# Patient Record
Sex: MALE | Race: WHITE | NOT HISPANIC OR LATINO | Employment: FULL TIME | URBAN - METROPOLITAN AREA
[De-identification: names, ages, dates, MRNs, and addresses within clinical notes are randomized per-mention and may not be internally consistent; named-entity substitution may affect disease eponyms.]

---

## 2017-03-19 DIAGNOSIS — I10 ESSENTIAL HYPERTENSION: ICD-10-CM

## 2017-03-20 RX ORDER — LOSARTAN POTASSIUM 50 MG/1
TABLET ORAL
Qty: 90 TABLET | Refills: 0 | Status: SHIPPED | OUTPATIENT
Start: 2017-03-20 | End: 2017-04-13 | Stop reason: SDUPTHER

## 2017-03-22 ENCOUNTER — DOCUMENTATION ONLY (OUTPATIENT)
Dept: FAMILY MEDICINE | Facility: CLINIC | Age: 47
End: 2017-03-22

## 2017-03-22 NOTE — PROGRESS NOTES
Pre-Visit Chart Review  For Appointment Scheduled on 3-    Health Maintenance Due   Topic Date Due    TETANUS VACCINE  08/04/1989    Influenza Vaccine  08/01/2016

## 2017-03-23 ENCOUNTER — OFFICE VISIT (OUTPATIENT)
Dept: FAMILY MEDICINE | Facility: CLINIC | Age: 47
End: 2017-03-23
Payer: COMMERCIAL

## 2017-03-23 VITALS
SYSTOLIC BLOOD PRESSURE: 140 MMHG | BODY MASS INDEX: 40.53 KG/M2 | HEART RATE: 89 BPM | WEIGHT: 252.19 LBS | HEIGHT: 66 IN | TEMPERATURE: 98 F | DIASTOLIC BLOOD PRESSURE: 100 MMHG

## 2017-03-23 DIAGNOSIS — Z00.00 ANNUAL PHYSICAL EXAM: Primary | ICD-10-CM

## 2017-03-23 DIAGNOSIS — Z23 NEED FOR TDAP VACCINATION: ICD-10-CM

## 2017-03-23 DIAGNOSIS — I10 ESSENTIAL HYPERTENSION: ICD-10-CM

## 2017-03-23 DIAGNOSIS — E66.01 MORBID OBESITY DUE TO EXCESS CALORIES: ICD-10-CM

## 2017-03-23 PROCEDURE — 99396 PREV VISIT EST AGE 40-64: CPT | Mod: S$GLB,,, | Performed by: NURSE PRACTITIONER

## 2017-03-23 PROCEDURE — 99999 PR PBB SHADOW E&M-EST. PATIENT-LVL III: CPT | Mod: PBBFAC,,, | Performed by: NURSE PRACTITIONER

## 2017-03-23 PROCEDURE — 3077F SYST BP >= 140 MM HG: CPT | Mod: S$GLB,,, | Performed by: NURSE PRACTITIONER

## 2017-03-23 PROCEDURE — 87591 N.GONORRHOEAE DNA AMP PROB: CPT

## 2017-03-23 PROCEDURE — 3080F DIAST BP >= 90 MM HG: CPT | Mod: S$GLB,,, | Performed by: NURSE PRACTITIONER

## 2017-03-23 NOTE — MR AVS SNAPSHOT
[facility]  [address] [place] [place] [address]  Phone: [phone]  Fax: [phone]                  Shar Hummel   3/23/2017 5:00 PM   Office Visit    Description:  Male : 1970   Provider:  BARRY Delgadillo   Department:  [facility]           Reason for Visit     Annual Exam           Diagnoses this Visit        Comments    Need for Tdap vaccination    -  Primary     Annual physical exam                To Do List           Future Appointments        Provider Department Dept Phone    3/25/2017 9:45 AM SELVIN BLANDON [facility] [phone]    2017 4:20 PM Deena Freeman PA-C [facility] [phone]    7/3/2017 4:00 PM Mahogany Ferreira MD [facility] [phone]      Goals (5 Years of Data)     None      Follow-Up and Disposition     Return in about 2 weeks (around 2017).       These Medications        Disp Refills Start End    diphth,pertus,acell,,tetanus (BOOSTRIX) 2.5-8-5 Lf-mcg-Lf/0.5mL Syrg injection 0.5 mL 0 3/23/2017 3/23/2017    Inject 0.5 mLs into the muscle once. - Intramuscular    Pharmacy: [id number] - [place] - [address] Ph #: [phone]         [facility]     [facility] On Call Nurse Care Line -  Assistance  Registered nurses in the [facility] provide clinical advisement, health education, appointment booking, and other advisory services.  Call for this free service at [phone].             Medications           Message regarding Medications     Verify the changes and/or additions to your medication regime listed below are the same as discussed with your clinician today.  If any of these changes or additions are incorrect, please notify your healthcare provider.        START taking these NEW medications        Refills    diphth,pertus,acell,,tetanus (BOOSTRIX) 2.5-8-5 Lf-mcg-Lf/0.5mL Syrg injection 0    Sig: Inject 0.5 mLs into the muscle once.  "   Class: Normal    Route: Intramuscular      STOP taking these medications     desvenlafaxine succinate (PRISTIQ) 50 MG Tb24 Take 1 tablet (50 mg total) by mouth once daily.           Verify that the below list of medications is an accurate representation of the medications you are currently taking.  If none reported, the list may be blank. If incorrect, please contact your healthcare provider. Carry this list with you in case of emergency.           Current Medications     losartan (COZAAR) 50 MG tablet TAKE 1 TABLET BY MOUTH EVERY DAY    tizanidine (ZANAFLEX) 4 MG tablet Take 1 tablet (4 mg total) by mouth every 6 (six) hours as needed.    diphth,pertus,acell,,tetanus (BOOSTRIX) 2.5-8-5 Lf-mcg-Lf/0.5mL Syrg injection Inject 0.5 mLs into the muscle once.           Clinical Reference Information           Your Vitals Were     BP Pulse Temp Height Weight BMI    143/94 (BP Location: Right arm, Patient Position: Sitting, BP Method: Automatic) 89 97.9 °F (36.6 °C) (Oral) 5' 6" (1.676 m) 114.4 kg (252 lb 3.3 oz) 40.71 kg/m2      Blood Pressure          Most Recent Value    BP  (!)  143/94      Allergies as of 3/23/2017     Bupropion Hcl    Neuromuscular Blockers, Steroidal    Sulfamethoxazole-trimethoprim      Immunizations Administered on Date of Encounter - 3/23/2017     None      Orders Placed During Today's Visit      Normal Orders This Visit    C. trachomatis/N. gonorrhoeae by AMP DNA Urine     Future Labs/Procedures Expected by Expires    Comprehensive metabolic panel  3/23/2017 5/22/2018    HIV-1 and HIV-2 antibodies  3/23/2017 5/22/2018    Lipid panel  3/23/2017 5/22/2018    RPR  3/23/2017 5/22/2018      Instructions      Low-Salt Diet  This diet removes foods that are high in salt. It also limits the amount of salt you use when cooking. It is most often used for people with high blood pressure, edema (fluid retention), and kidney, liver, or heart disease.  Table salt contains the mineral sodium. Your body needs " sodium to work normally. But too much sodium can make your health problems worse. Your healthcare provider is recommending a low-salt (also called low-sodium) diet for you. Your total daily allowance of salt is 1,500 to 2,300 milligrams (mg). It is less than 1 teaspoon of table salt. This means you can have only about 500 to 700 mg of sodium at each meal. People with certain health problems should limit salt intake to the lower end of the recommended range.    When you cook, dont add much salt. If you can cook without using salt, even better. Dont add salt to your food at the table.  When shopping, read food labels. Salt is often called sodium on the label. Choose foods that are salt-free, low salt, or very low salt. Note that foods with reduced salt may not lower your salt intake enough.    Beans, potatoes, and pasta  Ok: Dry beans, split peas, lentils, potatoes, rice, macaroni, pasta, spaghetti without added salt  Avoid: Potato chips, tortilla chips, and similar products  Breads and cereals  Ok: Low-sodium breads, rolls, cereals, and cakes; low-salt crackers, matzo crackers  Avoid: Salted crackers, pretzels, popcorn, Tongan toast, pancakes, muffins  Dairy  Ok: Milk, chocolate milk, hot chocolate mix, low-salt cheeses, and yogurt  Avoid: Processed cheese and cheese spreads; Roquefort, Camembert, and cottage cheese; buttermilk, instant breakfast drink  Desserts  Ok: Ice cream, frozen yogurt, juice bars, gelatin, cookies and pies, sugar, honey, jelly, hard candy  Avoid: Most pies, cakes and cookies prepared or processed with salt; instant pudding  Drinks  Ok: Tea, coffee, fizzy (carbonated) drinks, juices  Avoid: Flavored coffees, electrolyte replacement drinks, sports drinks  Meats  Ok: All fresh meat, fish, poultry, low-salt tuna, eggs, egg substitute  Avoid: Smoked, pickled, brine-cured, or salted meats and fish. This includes sebastian, chipped beef, corned beef, hot dogs, deli meats, ham, kosher meats, salt pork,  sausage, canned tuna, salted codfish, smoked salmon, herring, sardines, or anchovies.  Seasonings and spices  Ok: Most seasonings are okay. Good substitutes for salt include: fresh herb blends, hot sauce, lemon, garlic, william, vinegar, dry mustard, parsley, cilantro, horseradish, tomato paste, regular margarine, mayonnaise, unsalted butter, cream cheese, vegetable oil, cream, low-salt salad dressing and gravy.  Avoid: Regular ketchup, relishes, pickles, soy sauce, teriyaki sauce, Worcestershire sauce, BBQ sauce, tartar sauce, meat tenderizer, chili sauce, regular gravy, regular salad dressing, salted butter  Soups  Ok: Low-salt soups and broths made with allowed foods  Avoid: Bouillon cubes, soups with smoked or salted meats, regular soup and broth  Vegetables  Ok: Most vegetables are okay; also low-salt tomato and vegetable juices  Avoid: Sauerkraut and other brine-soaked vegetables; pickles and other pickled vegetables; tomato juice, olives  Date Last Reviewed: 8/1/2016 © 2000-2016 [employer] [address] [place] [address]. All rights reserved. This information is not intended as a substitute for professional medical care. Always follow your healthcare professional's instructions.             Language Assistance Services     ATTENTION: Language assistance services are available, free of charge. Please call [phone].      ATENCIÓN: Si ednala fidel, tiene a doshi disposición servicios gratuitos de asistencia lingüística. Llame al [phone].     CHÚ Ý: N?u b?n nói Ti?ng Vi?t, có các d?ch v? h? tr? ngôn ng? mi?n phí dành cho b?n. G?i s? [phone].         [facility] complies with applicable Federal civil rights laws and does not discriminate on the basis of race, color, national origin, age, disability, or sex.

## 2017-03-23 NOTE — PROGRESS NOTES
Subjective:       Patient ID: Shar Hummel is a 46 y.o. male.    Chief Complaint: Annual Exam    HPI Comments: Mr. Hummel presents to the clinic today for annual physical exam.  He has a history of hypertension and blood pressure is elevated today.  He thinks he forgot to take his blood pressure medication last night.  He states he had his yearly eye exam and was told he has papilledema.  He was referred to a Neurologist and he had an MRI Brain (Dr. Johns) which showed acoustic neuroma on the right side.  He denies hearing loss.  He does get frequent headaches.  He will be seeing Neurosurgery, Dr. Bass, next week for an initial consult.  He does request STD testing today.  He does not exercise at all.  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  He eats poorly as well and states he knows what he needs to eat but has no motivation to eat healthier.  He does have history of depression and states he has tried many antidepressants which all caused unwanted side effects.  He does not want to try therapy or CBT.  Denies SI/HI.  He declines flu shot and he needs Tdap.    Review of Systems   Constitutional: Negative for activity change and unexpected weight change.   HENT: Negative for hearing loss, rhinorrhea and trouble swallowing.    Eyes: Negative for discharge and visual disturbance.   Respiratory: Negative for chest tightness and wheezing.    Cardiovascular: Negative for chest pain and palpitations.   Gastrointestinal: Negative for blood in stool, constipation, diarrhea and vomiting.   Endocrine: Negative for polydipsia and polyuria.   Genitourinary: Negative for difficulty urinating, hematuria and urgency.   Musculoskeletal: Negative for arthralgias and joint swelling.   Neurological: Positive for headaches. Negative for weakness.   Psychiatric/Behavioral: Positive for dysphoric mood. Negative for confusion, sleep disturbance and suicidal ideas. The patient is not nervous/anxious.         Objective:      Physical Exam   Constitutional: He is oriented to person, place, and time. He appears well-developed and well-nourished. No distress.   HENT:   Head: Normocephalic and atraumatic.   Right Ear: External ear normal.   Left Ear: External ear normal.   Mouth/Throat: Oropharynx is clear and moist. No oropharyngeal exudate.   Eyes: Pupils are equal, round, and reactive to light. Right eye exhibits no discharge. Left eye exhibits no discharge.   Neck: Neck supple. No thyromegaly present.   Cardiovascular: Normal rate and regular rhythm.  Exam reveals no gallop and no friction rub.    No murmur heard.  Pulmonary/Chest: Effort normal and breath sounds normal. No respiratory distress. He has no wheezes. He has no rales.   Abdominal: Soft. He exhibits no distension. There is no tenderness.   Lymphadenopathy:     He has no cervical adenopathy.   Neurological: He is alert and oriented to person, place, and time. Coordination normal.   Skin: Skin is warm and dry.   Psychiatric: He has a normal mood and affect. His behavior is normal. Thought content normal.   Vitals reviewed.          Current Outpatient Prescriptions:     losartan (COZAAR) 50 MG tablet, TAKE 1 TABLET BY MOUTH EVERY DAY, Disp: 90 tablet, Rfl: 0    tizanidine (ZANAFLEX) 4 MG tablet, Take 1 tablet (4 mg total) by mouth every 6 (six) hours as needed., Disp: 90 tablet, Rfl: 1    diphth,pertus,acell,,tetanus (BOOSTRIX) 2.5-8-5 Lf-mcg-Lf/0.5mL Syrg injection, Inject 0.5 mLs into the muscle once., Disp: 0.5 mL, Rfl: 0  Assessment:       1. Annual physical exam    2. Need for Tdap vaccination    3. Essential hypertension    4. Morbid obesity due to excess calories        Plan:     Annual physical exam  Fasting labs.  -     Comprehensive metabolic panel; Future; Expected date: 3/23/17  -     Lipid panel; Future; Expected date: 3/23/17  -     C. trachomatis/N. gonorrhoeae by AMP DNA Urine  -     HIV-1 and HIV-2 antibodies; Future; Expected date:  3/23/17  -     RPR; Future; Expected date: 3/23/17    Need for Tdap vaccination  -     diphth,pertus,acell,,tetanus (BOOSTRIX) 2.5-8-5 Lf-mcg-Lf/0.5mL Syrg injection; Inject 0.5 mLs into the muscle once.  Dispense: 0.5 mL; Refill: 0    Essential hypertension  Uncontrolled, forgot to take meds before appointment.  2 week f/u nurse visit for BP check.  Bring BP log.    Morbid obesity due to excess calories  Discussed health dangers of obesity including higher risk for diabetes, heart disease, cancer.  Encouraged patient to eat protein in the morning. Limit portion sizes.  Routine exercise.   Comprehensive Weight Loss program handout given.  He will call if he decides to do this.   Discussed benefits of a diet diary.    Patient readiness: nonacceptance and barriers:readiness and occupational issues    During the course of the visit the patient was educated and counseled about the following:     Hypertension:   Medication: no change.  Dietary sodium restriction.  Regular aerobic exercise.  Check blood pressures daily and record.  Follow up: 2 weeks and as needed.  Obesity:   General weight loss/lifestyle modification strategies discussed (elicit support from others; identify saboteurs; non-food rewards, etc).  Informal exercise measures discussed, e.g. taking stairs instead of elevator.  Regular aerobic exercise program discussed.    Goals: Hypertension: Reduce Blood Pressure and Obesity: Reduce calorie intake and BMI    Did patient meet goals/outcomes: No    The following self management tools provided: blood pressure log    Patient Instructions (the written plan) was given to the patient/family.     Time spent with patient: 30 minutes

## 2017-03-23 NOTE — PATIENT INSTRUCTIONS

## 2017-03-24 ENCOUNTER — PATIENT MESSAGE (OUTPATIENT)
Dept: FAMILY MEDICINE | Facility: CLINIC | Age: 47
End: 2017-03-24

## 2017-03-24 ENCOUNTER — DOCUMENTATION ONLY (OUTPATIENT)
Dept: FAMILY MEDICINE | Facility: CLINIC | Age: 47
End: 2017-03-24

## 2017-03-24 NOTE — PROGRESS NOTES
Pre-Visit Chart Review  For Appointment Scheduled on 4/7/17    Health Maintenance Due   Topic Date Due    TETANUS VACCINE  08/04/1989    Influenza Vaccine  08/01/2016

## 2017-03-25 ENCOUNTER — LAB VISIT (OUTPATIENT)
Dept: LAB | Facility: HOSPITAL | Age: 47
End: 2017-03-25
Attending: FAMILY MEDICINE
Payer: COMMERCIAL

## 2017-03-25 DIAGNOSIS — Z00.00 ANNUAL PHYSICAL EXAM: ICD-10-CM

## 2017-03-25 LAB
ALBUMIN SERPL BCP-MCNC: 3.9 G/DL
ALP SERPL-CCNC: 57 U/L
ALT SERPL W/O P-5'-P-CCNC: 19 U/L
ANION GAP SERPL CALC-SCNC: 9 MMOL/L
AST SERPL-CCNC: 18 U/L
BILIRUB SERPL-MCNC: 1 MG/DL
BUN SERPL-MCNC: 14 MG/DL
CALCIUM SERPL-MCNC: 9.2 MG/DL
CHLORIDE SERPL-SCNC: 106 MMOL/L
CHOLEST/HDLC SERPL: 5 {RATIO}
CO2 SERPL-SCNC: 26 MMOL/L
CREAT SERPL-MCNC: 1 MG/DL
EST. GFR  (AFRICAN AMERICAN): >60 ML/MIN/1.73 M^2
EST. GFR  (NON AFRICAN AMERICAN): >60 ML/MIN/1.73 M^2
GLUCOSE SERPL-MCNC: 91 MG/DL
HDL/CHOLESTEROL RATIO: 20 %
HDLC SERPL-MCNC: 140 MG/DL
HDLC SERPL-MCNC: 28 MG/DL
LDLC SERPL CALC-MCNC: 91.2 MG/DL
NONHDLC SERPL-MCNC: 112 MG/DL
POTASSIUM SERPL-SCNC: 4.2 MMOL/L
PROT SERPL-MCNC: 7.2 G/DL
SODIUM SERPL-SCNC: 141 MMOL/L
TRIGL SERPL-MCNC: 104 MG/DL

## 2017-03-25 PROCEDURE — 80053 COMPREHEN METABOLIC PANEL: CPT

## 2017-03-25 PROCEDURE — 36415 COLL VENOUS BLD VENIPUNCTURE: CPT | Mod: PO

## 2017-03-25 PROCEDURE — 86592 SYPHILIS TEST NON-TREP QUAL: CPT

## 2017-03-25 PROCEDURE — 80061 LIPID PANEL: CPT

## 2017-03-25 PROCEDURE — 86703 HIV-1/HIV-2 1 RESULT ANTBDY: CPT

## 2017-03-27 LAB
HIV 1+2 AB+HIV1 P24 AG SERPL QL IA: NEGATIVE
RPR SER QL: NORMAL

## 2017-03-28 LAB
C TRACH DNA SPEC QL NAA+PROBE: NOT DETECTED
N GONORRHOEA DNA SPEC QL NAA+PROBE: NOT DETECTED

## 2017-04-13 DIAGNOSIS — I10 ESSENTIAL HYPERTENSION: ICD-10-CM

## 2017-04-13 RX ORDER — LOSARTAN POTASSIUM 50 MG/1
50 TABLET ORAL DAILY
Qty: 90 TABLET | Refills: 0 | Status: SHIPPED | OUTPATIENT
Start: 2017-04-13 | End: 2017-07-17 | Stop reason: SDUPTHER

## 2017-05-03 ENCOUNTER — DOCUMENTATION ONLY (OUTPATIENT)
Dept: FAMILY MEDICINE | Facility: CLINIC | Age: 47
End: 2017-05-03

## 2017-05-03 ENCOUNTER — TELEPHONE (OUTPATIENT)
Dept: FAMILY MEDICINE | Facility: CLINIC | Age: 47
End: 2017-05-03

## 2017-05-03 NOTE — TELEPHONE ENCOUNTER
Left message to inquire for information on what kind of surgery patient is having that he needs appointment check up tomorrow, 5.4.17 with BARRY Perez

## 2017-05-03 NOTE — PROGRESS NOTES
Pre-Visit Chart Review  For Appointment Scheduled on 5/4/17    Health Maintenance Due   Topic Date Due    TETANUS VACCINE  08/04/1989

## 2017-05-03 NOTE — TELEPHONE ENCOUNTER
Spoke with patient who stated he needed clearance for a form of brain surgery, I stated BARRY Perez could not do this type of appointment, and I would speak with his listed PCP and nurse about this issue. I mentioned the time frame problem of May 16 surgery and we will be working on this.

## 2017-05-04 ENCOUNTER — TELEPHONE (OUTPATIENT)
Dept: FAMILY MEDICINE | Facility: CLINIC | Age: 47
End: 2017-05-04

## 2017-05-04 NOTE — TELEPHONE ENCOUNTER
----- Message from Ginger Ellis sent at 5/4/2017  2:06 PM CDT -----  Patient is returning nurses call contact patient at [phone].      Thank you

## 2017-05-09 ENCOUNTER — DOCUMENTATION ONLY (OUTPATIENT)
Dept: FAMILY MEDICINE | Facility: CLINIC | Age: 47
End: 2017-05-09

## 2017-05-09 NOTE — PROGRESS NOTES
Pre-Visit Chart Review  For Appointment Scheduled on 5/10/17.    Health Maintenance Due   Topic Date Due    TETANUS VACCINE  08/04/1989

## 2017-05-10 ENCOUNTER — PATIENT MESSAGE (OUTPATIENT)
Dept: ADMINISTRATIVE | Facility: OTHER | Age: 47
End: 2017-05-10

## 2017-05-10 ENCOUNTER — LAB VISIT (OUTPATIENT)
Dept: LAB | Facility: HOSPITAL | Age: 47
End: 2017-05-10
Attending: FAMILY MEDICINE
Payer: COMMERCIAL

## 2017-05-10 ENCOUNTER — OFFICE VISIT (OUTPATIENT)
Dept: FAMILY MEDICINE | Facility: CLINIC | Age: 47
End: 2017-05-10
Payer: COMMERCIAL

## 2017-05-10 ENCOUNTER — HOSPITAL ENCOUNTER (OUTPATIENT)
Dept: RADIOLOGY | Facility: CLINIC | Age: 47
Discharge: HOME | End: 2017-05-10
Attending: FAMILY MEDICINE
Payer: COMMERCIAL

## 2017-05-10 VITALS
TEMPERATURE: 98 F | WEIGHT: 254.19 LBS | HEART RATE: 89 BPM | BODY MASS INDEX: 40.85 KG/M2 | HEIGHT: 66 IN | SYSTOLIC BLOOD PRESSURE: 120 MMHG | DIASTOLIC BLOOD PRESSURE: 80 MMHG

## 2017-05-10 DIAGNOSIS — Z01.818 PRE-OP EVALUATION: ICD-10-CM

## 2017-05-10 DIAGNOSIS — E66.01 MORBID OBESITY DUE TO EXCESS CALORIES: ICD-10-CM

## 2017-05-10 DIAGNOSIS — Z01.818 PRE-OP EVALUATION: Primary | ICD-10-CM

## 2017-05-10 DIAGNOSIS — I10 ESSENTIAL HYPERTENSION: ICD-10-CM

## 2017-05-10 LAB
BASOPHILS # BLD AUTO: 0.04 K/UL
BASOPHILS NFR BLD: 0.4 %
DIFFERENTIAL METHOD: NORMAL
EOSINOPHIL # BLD AUTO: 0.2 K/UL
EOSINOPHIL NFR BLD: 2.4 %
ERYTHROCYTE [DISTWIDTH] IN BLOOD BY AUTOMATED COUNT: 13.9 %
HCT VFR BLD AUTO: 43.5 %
HGB BLD-MCNC: 14.2 G/DL
LYMPHOCYTES # BLD AUTO: 2.5 K/UL
LYMPHOCYTES NFR BLD: 25.3 %
MCH RBC QN AUTO: 28.8 PG
MCHC RBC AUTO-ENTMCNC: 32.6 %
MCV RBC AUTO: 88 FL
MONOCYTES # BLD AUTO: 0.7 K/UL
MONOCYTES NFR BLD: 7.6 %
NEUTROPHILS # BLD AUTO: 6.2 K/UL
NEUTROPHILS NFR BLD: 64.2 %
PLATELET # BLD AUTO: 225 K/UL
PMV BLD AUTO: 9.4 FL
RBC # BLD AUTO: 4.93 M/UL
WBC # BLD AUTO: 9.71 K/UL

## 2017-05-10 PROCEDURE — 3074F SYST BP LT 130 MM HG: CPT | Mod: S$GLB,,, | Performed by: FAMILY MEDICINE

## 2017-05-10 PROCEDURE — 1160F RVW MEDS BY RX/DR IN RCRD: CPT | Mod: S$GLB,,, | Performed by: FAMILY MEDICINE

## 2017-05-10 PROCEDURE — 99999 PR PBB SHADOW E&M-EST. PATIENT-LVL III: CPT | Mod: PBBFAC,,, | Performed by: FAMILY MEDICINE

## 2017-05-10 PROCEDURE — 99214 OFFICE O/P EST MOD 30 MIN: CPT | Mod: S$GLB,,, | Performed by: FAMILY MEDICINE

## 2017-05-10 PROCEDURE — 71020 XR CHEST PA AND LATERAL: CPT | Mod: 26,,, | Performed by: RADIOLOGY

## 2017-05-10 PROCEDURE — 71020 XR CHEST PA AND LATERAL: CPT | Mod: TC,PO

## 2017-05-10 PROCEDURE — 3079F DIAST BP 80-89 MM HG: CPT | Mod: S$GLB,,, | Performed by: FAMILY MEDICINE

## 2017-05-10 PROCEDURE — 93005 ELECTROCARDIOGRAM TRACING: CPT | Mod: S$GLB,,, | Performed by: FAMILY MEDICINE

## 2017-05-10 PROCEDURE — 93010 ELECTROCARDIOGRAM REPORT: CPT | Mod: S$GLB,,, | Performed by: INTERNAL MEDICINE

## 2017-05-10 PROCEDURE — 36415 COLL VENOUS BLD VENIPUNCTURE: CPT | Mod: PO

## 2017-05-10 PROCEDURE — 85025 COMPLETE CBC W/AUTO DIFF WBC: CPT

## 2017-05-10 RX ORDER — NAPROXEN AND ESOMEPRAZOLE MAGNESIUM 500; 20 MG/1; MG/1
1 TABLET, DELAYED RELEASE ORAL 2 TIMES DAILY
COMMUNITY
Start: 2017-04-07 | End: 2018-10-17

## 2017-05-10 NOTE — PATIENT INSTRUCTIONS
Weight Management: Getting Started  Healthy bodies come in all shapes and sizes. Not all bodies are made to be thin. For some people, a healthy weight is higher than the average weight listed on weight charts. Your healthcare provider can help you decide on a healthy weight for you.    Reasons to lose weight  Losing weight can help with some health problems, such as high blood pressure, heart disease, diabetes, sleep apnea, and arthritis. You may also feel more energy.  Set your long-term goal  Your goal doesn't even have to be a specific weight. You may decide on a fitness goal (such as being able to walk 10 miles a week), or a health goal (such as lowering your blood pressure). Choose a goal that is measurable and reasonable, so you know when you've reached it. A goal of reaching a BMI of less than 25 is not always reasonable (or possible).   Make an action plan  Habits dont change overnight. Setting your goals too high can leave you feeling discouraged if you cant reach them. Be realistic. Choose one or two small changes you can make now. Set an action plan for how you are going to make these changes. When you can stick to this plan, keep making a few more small changes. Taking small steps will help you stay on the path to success.  Track your progress  Write down your goals. Then, keep a daily record of your progress. Write down what you eat and how active you are. This record lets you look back on how much youve done. It may also help when youre feeling frustrated. Reward yourself for success. Even if you dont reach every goal, give yourself credit for what you do get done.  Get support  Encouragement from others can help make losing weight easier. Ask your family members and friends for support. They may even want to join you. Also look to your healthcare provider, registered dietitian, and  for help. Your local hospital can give you more information about nutrition, exercise, and  weight loss.  Date Last Reviewed: 1/31/2016 © 2000-2016 [employer] [address] [place] [address], [place] [address]. All rights reserved. This information is not intended as a substitute for professional medical care. Always follow your healthcare professional's instructions.        Walking for Fitness  Fitness walking has something for everyone, even people who are already fit. Walking is one of the safest ways to condition your body aerobically. It can boost energy, help you lose weight, and reduce stress.    Physical benefits  · Walking strengthens your heart and lungs, and tones your muscles.  · When walking, your feet land with less impact than in other sports. This reduces chances of muscle, bone, and joint injury.  · Regular walking improves your cholesterol levels and lowers your risk of heart disease. And it helps you control your blood sugar if you have diabetes.  · Walking is a weight-bearing activity, which helps maintain bone density. This can help prevent osteoporosis.  Personal rewards  · Taking walks can help you relax and manage stress. And fitness walking may make you feel better about yourself.  · Walking can help you sleep better at night and make you less likely to be depressed.  · Regular walking may help maintain your memory as you get older.  · Walking is a great way to spend extra time with friends and family members. Be sure to invite your dog along!  Q&A about fitness walking  Q: Will walking keep me fit?  A: Yes. Regular walking at the right pace gives you all the benefits of other aerobic activities, such as jogging and swimming.  Q: Will walking help me lose weight and keep it off?  A: Yes. Per mile, walking can burn as many calories as jogging. Your health care provider can help work walking into your weight-loss plan.  Q: Is walking safe for my health?  A: Yes. Walking is safe if you have high blood pressure, diabetes, heart disease, or other conditions. Talk to your health  care provider before you start.  Date Last Reviewed: 5/9/2015  © 6123-2161 [employer]. [address] [place] [address], [place] [address]. All rights reserved. This information is not intended as a substitute for professional medical care. Always follow your healthcare professional's instructions.

## 2017-05-10 NOTE — MR AVS SNAPSHOT
[facility]  [address]  [place] [address]  Phone: [phone]  Fax: [phone]                  Shar Hummel   5/10/2017 2:20 PM   Office Visit    Description:  Male : 1970   Provider:  Joshua Hebert MD   Department:  [place] - Family Medicine           Reason for Visit     Pre-op Exam           Diagnoses this Visit        Comments    Pre-op evaluation    -  Primary            To Do List           Future Appointments        Provider Department Dept Phone    2017 8:20 AM Joshua Hebert MD [facility] [phone]      Goals (5 Years of Data)     None      [facility]     [facility] -  Assistance  Unless otherwise directed by your provider, please contact [facility], our nurse care line that is available for  assistance.     Registered nurses in the [facility] provide: appointment scheduling, clinical advisement, health education, and other advisory services.  Call: [phone] (toll free)               Medications           Message regarding Medications     Verify the changes and/or additions to your medication regime listed below are the same as discussed with your clinician today.  If any of these changes or additions are incorrect, please notify your healthcare provider.             Verify that the below list of medications is an accurate representation of the medications you are currently taking.  If none reported, the list may be blank. If incorrect, please contact your healthcare provider. Carry this list with you in case of emergency.           Current Medications     losartan (COZAAR) 50 MG tablet Take 1 tablet (50 mg total) by mouth once daily.    tizanidine (ZANAFLEX) 4 MG tablet Take 1 tablet (4 mg total) by mouth every 6 (six) hours as needed.    VIMOVO 500-20 mg TbID Take 1 tablet by mouth 2 (two) times daily.            Clinical Reference Information           Your Vitals Were     BP Pulse  "Temp Height Weight BMI    120/80 89 98.4 °F (36.9 °C) 5' 6" (1.676 m) 115.3 kg (254 lb 3.1 oz) 41.03 kg/m2      Blood Pressure          Most Recent Value    BP  120/80      Allergies as of 5/10/2017     Bupropion Hcl    Neuromuscular Blockers, Steroidal    Sulfa (Sulfonamide Antibiotics)    Sulfamethoxazole-trimethoprim    Gabapentin      Immunizations Administered on Date of Encounter - 5/10/2017     None      Orders Placed During Today's Visit      Normal Orders This Visit    Assign HDMP Onboarding Questionnaire Series     EKG 12-lead     Hypertension Digital Medicine (HDMP)  Enrollment Order     Future Labs/Procedures Expected by Expires    CBC auto differential  5/10/2017 7/9/2018    X-Ray Chest PA And Lateral  5/10/2017 5/10/2018      Instructions      Weight Management: Getting Started  Healthy bodies come in all shapes and sizes. Not all bodies are made to be thin. For some people, a healthy weight is higher than the average weight listed on weight charts. Your healthcare provider can help you decide on a healthy weight for you.    Reasons to lose weight  Losing weight can help with some health problems, such as high blood pressure, heart disease, diabetes, sleep apnea, and arthritis. You may also feel more energy.  Set your long-term goal  Your goal doesn't even have to be a specific weight. You may decide on a fitness goal (such as being able to walk 10 miles a week), or a health goal (such as lowering your blood pressure). Choose a goal that is measurable and reasonable, so you know when you've reached it. A goal of reaching a BMI of less than 25 is not always reasonable (or possible).   Make an action plan  Habits dont change overnight. Setting your goals too high can leave you feeling discouraged if you cant reach them. Be realistic. Choose one or two small changes you can make now. Set an action plan for how you are going to make these changes. When you can stick to this plan, keep making a few more " small changes. Taking small steps will help you stay on the path to success.  Track your progress  Write down your goals. Then, keep a daily record of your progress. Write down what you eat and how active you are. This record lets you look back on how much youve done. It may also help when youre feeling frustrated. Reward yourself for success. Even if you dont reach every goal, give yourself credit for what you do get done.  Get support  Encouragement from others can help make losing weight easier. Ask your family members and friends for support. They may even want to join you. Also look to your healthcare provider, registered dietitian, and  for help. Your local hospital can give you more information about nutrition, exercise, and weight loss.  Date Last Reviewed: 1/31/2016 © 2000-2016 [employer]. [address] [place] [address]. All rights reserved. This information is not intended as a substitute for professional medical care. Always follow your healthcare professional's instructions.        Walking for Fitness  Fitness walking has something for everyone, even people who are already fit. Walking is one of the safest ways to condition your body aerobically. It can boost energy, help you lose weight, and reduce stress.    Physical benefits  · Walking strengthens your heart and lungs, and tones your muscles.  · When walking, your feet land with less impact than in other sports. This reduces chances of muscle, bone, and joint injury.  · Regular walking improves your cholesterol levels and lowers your risk of heart disease. And it helps you control your blood sugar if you have diabetes.  · Walking is a weight-bearing activity, which helps maintain bone density. This can help prevent osteoporosis.  Personal rewards  · Taking walks can help you relax and manage stress. And fitness walking may make you feel better about yourself.  · Walking can help you sleep better at  night and make you less likely to be depressed.  · Regular walking may help maintain your memory as you get older.  · Walking is a great way to spend extra time with friends and family members. Be sure to invite your dog along!  Q&A about fitness walking  Q: Will walking keep me fit?  A: Yes. Regular walking at the right pace gives you all the benefits of other aerobic activities, such as jogging and swimming.  Q: Will walking help me lose weight and keep it off?  A: Yes. Per mile, walking can burn as many calories as jogging. Your health care provider can help work walking into your weight-loss plan.  Q: Is walking safe for my health?  A: Yes. Walking is safe if you have high blood pressure, diabetes, heart disease, or other conditions. Talk to your health care provider before you start.  Date Last Reviewed: 5/9/2015  © 3535-9624 [employer]. [address] [place] [address]. All rights reserved. This information is not intended as a substitute for professional medical care. Always follow your healthcare professional's instructions.             Hypertension Digital Medicine Program Information              As discussed, you could benefit from enrolling in the Hypertension Digital Medicine Program. The goal of the program is to help you effectively manage your high blood pressure through an appropriate balance of medication and lifestyle changes, all from the comfort of your own home. Effectively managed blood pressure reduces your risk of having a heart attack or a stroke in the future, so you can enjoy a long and healthy life. We want to make blood pressure control your goal.        What is the Hypertension Digital Medicine Program?  Finding the right balance in managing high blood pressure is different for every person, and we will tailor our treatment approach based on your individual needs using the most current evidence-based guidelines.  As a participant, you will be able to send home  "blood pressure readings, on your schedule, directly into your medical record at [facility]. I, along with a team of pharmacists, will monitor this data, help you adjust your medication(s) and/or make lifestyle recommendations to better manage your hypertension.       What are the requirements of the program?   Participating in the program is as easy as 1 - 2 - 3.   1. Smartphone - You must have your own smartphone to participate (either an iPhone or an Android phone such as [employer], [employer], [employer], [employer], [employer], or [employer]).    2. [facility] account - [facility] offers a great way to connect through the online patient portal, [facility], which is free and provides you access to your [facility] medical record.  3. Digital blood pressure cuff - Using this blood pressure cuff that hooks up to your smartphone, you will be able to send in your home blood pressure readings to the Hypertension Digital Medicine Team. Cuffs are available for purchase at the [facility] locations and financial assistance plans are available           What can I do to get started?   Complete the Hypertension Digital Medicine Patient Consent questionnaire already available in your [facility] account. To access and complete this questionnaire, either  - Use the [facility] website on a computer and select My Medical Record, then Questionnaires.  - Use the Fl[employer] uyen on your smartphone and select "Questionnaires".    Once you have given consent, additional onboarding questionnaires will be assigned to you to complete prior to starting the program. You must return to the "Questionnaires" page of your [facility] account to start the additional questionnaires.     How do I purchase a digital blood pressure cuff and obtain a discount?  [facility] has negotiated a discounted price from industry leading healthcare technology companies. While supplies last patients using an Apple iPhone can purchase an [employer] Ease Blood Pressure cuff for $31.99 (originally " $39.99) and Android users can purchase the My [facility]ist Blood Pressure Monitor for $79.99 (originally $99.99).  Financial assistance plans are also available for increased discounting. Purchase locations will be sent once all onboarding questionnaires have been completed (see above).    If you have any questions regarding this program or would like more information, please visit our Hypertension Digital Medicine website ([email or web address]) or call Digital Medicine Patient Support at [phone].          Language Assistance Services     ATTENTION: Language assistance services are available, free of charge. Please call [phone].      ATENCIÓN: Si habla español, tiene a doshi disposición servicios gratuitos de asistencia lingüística. Llame al [phone].     SAMSON Ý: N?u b?n nói Ti?ng Vi?t, có các d?ch v? h? tr? ngôn ng? mi?n phí dành cho b?n. G?i s? [phone].         [facility] complies with applicable Federal civil rights laws and does not discriminate on the basis of race, color, national origin, age, disability, or sex.

## 2017-05-11 ENCOUNTER — PATIENT MESSAGE (OUTPATIENT)
Dept: FAMILY MEDICINE | Facility: CLINIC | Age: 47
End: 2017-05-11

## 2017-05-11 NOTE — PROGRESS NOTES
"[facility]  Progress Note    Subjective:       Patient ID: Shar Hummel is a 46 y.o. male.    Chief Complaint: Pre-op Exam    HPI46 y.o.male with current medical history of hypertension and obesity is here today for pre-op clearance.  Patient is scheduled to have acoustic neuroma removed from right ear by ENT.  All patient's medical conditions are under good control.  No further complaints at today's visit.  Review of Systems   Constitutional: Negative for activity change and unexpected weight change.   HENT: Negative for hearing loss, rhinorrhea and trouble swallowing.    Eyes: Negative for discharge and visual disturbance.   Respiratory: Negative for chest tightness and wheezing.    Cardiovascular: Negative for chest pain and palpitations.   Gastrointestinal: Negative for blood in stool, constipation, diarrhea and vomiting.   Endocrine: Negative for polydipsia and polyuria.   Genitourinary: Negative for difficulty urinating, hematuria and urgency.   Musculoskeletal: Negative for arthralgias and joint swelling.   Neurological: Negative for weakness and headaches.   Psychiatric/Behavioral: Negative for confusion and dysphoric mood.       Objective:      Vitals:    05/10/17 1417 05/10/17 1440   BP: 134/89 120/80   BP Location: Right arm    Patient Position: Sitting    BP Method: Automatic    Pulse: 89    Temp: 98.6 °F (37 °C) 98.4 °F (36.9 °C)   TempSrc: Oral Comment: manual BP per MD   Weight: 115.3 kg (254 lb 3.1 oz)    Height: 5' 6" (1.676 m)      Body mass index is 41.03 kg/(m^2).  Physical Exam   Constitutional: He is oriented to person, place, and time. He appears well-developed and well-nourished.   HENT:   Head: Normocephalic and atraumatic.   Eyes: Conjunctivae and EOM are normal. Pupils are equal, round, and reactive to light.   Neck: Normal range of motion. Neck supple. No JVD present.   Cardiovascular: Normal rate, regular rhythm, normal heart sounds and intact distal pulses.  Exam reveals no " gallop and no friction rub.    No murmur heard.  Pulmonary/Chest: Effort normal and breath sounds normal. No respiratory distress. He has no wheezes.   Abdominal: Soft. Bowel sounds are normal. There is no tenderness.   Musculoskeletal: Normal range of motion.   Neurological: He is alert and oriented to person, place, and time. No cranial nerve deficit.   Skin: Skin is warm and dry.   Psychiatric: He has a normal mood and affect. His behavior is normal. Judgment and thought content normal.   Nursing note and vitals reviewed.      Assessment:       1. Pre-op evaluation    2. Morbid obesity due to excess calories    3. Essential hypertension        Plan:       Pre-op evaluation  -     CBC auto differential; Future; Expected date: 5/10/17  -     EKG 12-lead  -     X-Ray Chest PA And Lateral; Future; Expected date: 5/10/17    Morbid obesity due to excess calories        - Patient educated on diet and exercise     Essential hypertension        - Well controlled continue current medications  -     Hypertension Digital Medicine (HDMP)  Enrollment Order  -     Assign HDMP Onboarding Questionnaire Series    Patient readiness: acceptance and barriers:none    During the course of the visit the patient was educated and counseled about the following:     Hypertension:   Dietary sodium restriction.  Regular aerobic exercise.  Check blood pressures daily and record.  Obesity:   General weight loss/lifestyle modification strategies discussed (elicit support from others; identify saboteurs; non-food rewards, etc).  Informal exercise measures discussed, e.g. taking stairs instead of elevator.  Regular aerobic exercise program discussed.    Goals: Hypertension: Reduce Blood Pressure and Obesity: Reduce calorie intake and BMI    Did patient meet goals/outcomes: Yes    The following self management tools provided: blood pressure log  excercise log    Patient Instructions (the written plan) was given to the patient/family.     Time spent  with patient: 30 minutes      Return if symptoms worsen or fail to improve.  Joshua Hebert MD  [facility]  5/11/2017 8:32 AM

## 2017-05-19 ENCOUNTER — PATIENT MESSAGE (OUTPATIENT)
Dept: FAMILY MEDICINE | Facility: CLINIC | Age: 47
End: 2017-05-19

## 2017-05-29 ENCOUNTER — DOCUMENTATION ONLY (OUTPATIENT)
Dept: FAMILY MEDICINE | Facility: CLINIC | Age: 47
End: 2017-05-29

## 2017-05-29 NOTE — PROGRESS NOTES
Pre-Visit Chart Review  For Appointment Scheduled on (05/30/17)    Health Maintenance Due   Topic Date Due    TETANUS VACCINE  08/04/1988

## 2017-05-30 ENCOUNTER — HOSPITAL ENCOUNTER (OUTPATIENT)
Dept: RADIOLOGY | Facility: CLINIC | Age: 47
Discharge: HOME | End: 2017-05-30
Attending: FAMILY MEDICINE
Payer: COMMERCIAL

## 2017-05-30 ENCOUNTER — OFFICE VISIT (OUTPATIENT)
Dept: FAMILY MEDICINE | Facility: CLINIC | Age: 47
End: 2017-05-30
Payer: COMMERCIAL

## 2017-05-30 VITALS
HEIGHT: 66 IN | BODY MASS INDEX: 41.06 KG/M2 | SYSTOLIC BLOOD PRESSURE: 131 MMHG | DIASTOLIC BLOOD PRESSURE: 89 MMHG | HEART RATE: 103 BPM | WEIGHT: 255.5 LBS

## 2017-05-30 DIAGNOSIS — Z01.818 PREOP EXAMINATION: ICD-10-CM

## 2017-05-30 DIAGNOSIS — Z01.818 PREOP EXAMINATION: Primary | ICD-10-CM

## 2017-05-30 PROCEDURE — 71020 XR CHEST PA AND LATERAL: CPT | Mod: TC,PO

## 2017-05-30 PROCEDURE — 93010 ELECTROCARDIOGRAM REPORT: CPT | Mod: S$GLB,,, | Performed by: INTERNAL MEDICINE

## 2017-05-30 PROCEDURE — 71020 XR CHEST PA AND LATERAL: CPT | Mod: 26,,, | Performed by: RADIOLOGY

## 2017-05-30 PROCEDURE — 99214 OFFICE O/P EST MOD 30 MIN: CPT | Mod: S$GLB,,, | Performed by: FAMILY MEDICINE

## 2017-05-30 PROCEDURE — 99999 PR PBB SHADOW E&M-EST. PATIENT-LVL III: CPT | Mod: PBBFAC,,, | Performed by: FAMILY MEDICINE

## 2017-05-30 PROCEDURE — 93005 ELECTROCARDIOGRAM TRACING: CPT | Mod: S$GLB,,, | Performed by: FAMILY MEDICINE

## 2017-05-30 NOTE — PROGRESS NOTES
Subjective:     Pt presents to the office today for a preoperative consultation at the request of surgeon Dr Santiago who plans on performing Right retrosigmoid acoustic neuroma removal on June 20.   This consultation is requested for the specific conditions prompting preoperative evaluation (i.e. because of potential affect on operative risk): None.   Planned anesthesia: none.   The patient has the following known anesthesia issues: None.   Patients bleeding risk: no recent abnormal bleeding.   Patient does not have objections to receiving blood products if needed.    The following portions of the patient's history were reviewed and updated as appropriate: allergies, current medications, past family history, past medical history, past social history, past surgical history and problem list.    Review of Systems  Review of Systems   Constitutional: Negative for chills and fever.   HENT: Negative for sore throat.    Eyes: Negative for visual disturbance.   Respiratory: Negative for cough and shortness of breath.    Cardiovascular: Negative for chest pain and leg swelling.   Gastrointestinal: Negative for abdominal pain, blood in stool, constipation, diarrhea and vomiting.   Genitourinary: Negative for difficulty urinating, dysuria and hematuria.   Musculoskeletal: Negative for arthralgias.   Neurological: Negative for dizziness and weakness.      Objective:     Physical Exam   Constitutional: Patient appears well-developed and well-nourished. No distress.   HENT:   Head: Normocephalic and atraumatic.   Mouth/Throat: Oropharynx is clear and moist. No oropharyngeal exudate.   Eyes: EOM are normal. Pupils are equal, round, and reactive to light.   Neck: Normal range of motion. Neck supple. No thyromegaly present.   Cardiovascular: Normal rate, regular rhythm, normal heart sounds and intact distal pulses.    Pulmonary/Chest: Effort normal and breath sounds normal. No respiratory distress. She has no wheezes.   Abdominal:  Soft. Bowel sounds are normal. She exhibits no distension and no mass. There is no tenderness.   Musculoskeletal: She exhibits no edema.   Lymphadenopathy:     She has no cervical adenopathy.   Neurological: She is alert.   Skin: Skin is warm. No rash noted. No erythema.   Psychiatric: She has a normal mood and affect. Her behavior is normal.   Vitals reviewed.      Predictors of intubation difficulty:   Morbid obesity? yes - BMI 41.24   Anatomically abnormal facies? no   Prominent incisors? no   Receding mandible? no   Short, thick neck? yes    Neck range of motion: normal   Mallampati score: III (soft and hard palate and base of uvula visible)    Cardiographics  ECG: Unremarkable    Imaging  Chest x-ray: normal     Lab Review   Reviewed.     Assessment:       Patient with planned surgery as above.    Known risk factors for perioperative complications: None    Difficulty with intubation is anticipated.     Plan:     Revised Cardiac Risk Index for Pre-Operative Risk    [email or web address]    1. Preoperative workup as follows none.  2. Change in medication regimen before surgery: none, continue medication regimen including morning of surgery, with sip of water.  3. Prophylaxis for cardiac events with perioperative beta-blockers: should be considered, specific regimen per anesthesia.  4. Invasive hemodynamic monitoring perioperatively: at the discretion of anesthesiologist.  5. Deep vein thrombosis prophylaxis postoperatively:regimen to be chosen by surgical team.  6. Surveillance for postoperative MI with ECG immediately postoperatively and on postoperative days 1 and 2 AND troponin levels 24 hours postoperatively and on day 4 or hospital discharge (whichever comes first): at the discretion of anesthesiologist.    1. Preop examination  - Comprehensive metabolic panel; Future  - URINALYSIS  - CBC auto differential; Future  - PROTIME-INR; Future  - EKG RHYTHM STRIP (to  [place]); Future    Pt has been cleared for this procedure from a family medicine standpoint.    Portions of this note were created using Dragon voice recognition software. There may be voice recognition errors found in the text, and attempts were made to correct these errors prior to signature    Neil Sheldon MD    Family Medicine  5/30/2017

## 2017-05-30 NOTE — PATIENT INSTRUCTIONS
Weight Management: Getting Started  Healthy bodies come in all shapes and sizes. Not all bodies are made to be thin. For some people, a healthy weight is higher than the average weight listed on weight charts. Your healthcare provider can help you decide on a healthy weight for you.    Reasons to lose weight  Losing weight can help with some health problems, such as high blood pressure, heart disease, diabetes, sleep apnea, and arthritis. You may also feel more energy.  Set your long-term goal  Your goal doesn't even have to be a specific weight. You may decide on a fitness goal (such as being able to walk 10 miles a week), or a health goal (such as lowering your blood pressure). Choose a goal that is measurable and reasonable, so you know when you've reached it. A goal of reaching a BMI of less than 25 is not always reasonable (or possible).   Make an action plan  Habits dont change overnight. Setting your goals too high can leave you feeling discouraged if you cant reach them. Be realistic. Choose one or two small changes you can make now. Set an action plan for how you are going to make these changes. When you can stick to this plan, keep making a few more small changes. Taking small steps will help you stay on the path to success.  Track your progress  Write down your goals. Then, keep a daily record of your progress. Write down what you eat and how active you are. This record lets you look back on how much youve done. It may also help when youre feeling frustrated. Reward yourself for success. Even if you dont reach every goal, give yourself credit for what you do get done.  Get support  Encouragement from others can help make losing weight easier. Ask your family members and friends for support. They may even want to join you. Also look to your healthcare provider, registered dietitian, and  for help. Your local hospital can give you more information about nutrition, exercise, and  weight loss.  Date Last Reviewed: 1/31/2016  © 8182-9396 [employer]. [address] [place] [address], [place] [address]. All rights reserved. This information is not intended as a substitute for professional medical care. Always follow your healthcare professional's instructions.

## 2017-06-07 ENCOUNTER — TELEPHONE (OUTPATIENT)
Dept: FAMILY MEDICINE | Facility: CLINIC | Age: 47
End: 2017-06-07

## 2017-06-07 NOTE — TELEPHONE ENCOUNTER
----- Message from Sharon Yan sent at 6/6/2017 10:51 AM CDT -----  Contact: Gogo from [facility] [phone]   Gogo from [facility] called and asked if you will fax over for his Preop he is having surger June 20 2017 the fax is [phone] and the phone is [phone] please fax as soon as possible

## 2017-07-14 ENCOUNTER — PATIENT MESSAGE (OUTPATIENT)
Dept: ADMINISTRATIVE | Facility: OTHER | Age: 47
End: 2017-07-14

## 2017-07-17 DIAGNOSIS — I10 ESSENTIAL HYPERTENSION: ICD-10-CM

## 2017-07-17 RX ORDER — LOSARTAN POTASSIUM 50 MG/1
50 TABLET ORAL DAILY
Qty: 90 TABLET | Refills: 0 | Status: SHIPPED | OUTPATIENT
Start: 2017-07-17 | End: 2017-10-12 | Stop reason: SDUPTHER

## 2017-07-18 ENCOUNTER — PATIENT OUTREACH (OUTPATIENT)
Dept: OTHER | Facility: OTHER | Age: 47
End: 2017-07-18

## 2017-08-23 ENCOUNTER — PATIENT MESSAGE (OUTPATIENT)
Dept: FAMILY MEDICINE | Facility: CLINIC | Age: 47
End: 2017-08-23

## 2017-08-24 ENCOUNTER — OFFICE VISIT (OUTPATIENT)
Dept: FAMILY MEDICINE | Facility: CLINIC | Age: 47
End: 2017-08-24
Payer: COMMERCIAL

## 2017-08-24 VITALS
WEIGHT: 252.44 LBS | HEART RATE: 73 BPM | DIASTOLIC BLOOD PRESSURE: 81 MMHG | SYSTOLIC BLOOD PRESSURE: 124 MMHG | BODY MASS INDEX: 40.57 KG/M2 | HEIGHT: 66 IN

## 2017-08-24 DIAGNOSIS — G44.209 TENSION HEADACHE: Primary | ICD-10-CM

## 2017-08-24 PROCEDURE — 3074F SYST BP LT 130 MM HG: CPT | Mod: S$GLB,,, | Performed by: FAMILY MEDICINE

## 2017-08-24 PROCEDURE — 99214 OFFICE O/P EST MOD 30 MIN: CPT | Mod: S$GLB,,, | Performed by: FAMILY MEDICINE

## 2017-08-24 PROCEDURE — 3079F DIAST BP 80-89 MM HG: CPT | Mod: S$GLB,,, | Performed by: FAMILY MEDICINE

## 2017-08-24 PROCEDURE — 3008F BODY MASS INDEX DOCD: CPT | Mod: S$GLB,,, | Performed by: FAMILY MEDICINE

## 2017-08-24 PROCEDURE — 99999 PR PBB SHADOW E&M-EST. PATIENT-LVL III: CPT | Mod: PBBFAC,,, | Performed by: FAMILY MEDICINE

## 2017-08-24 RX ORDER — BUTALBITAL, ASPIRIN, AND CAFFEINE 325; 50; 40 MG/1; MG/1; MG/1
1 CAPSULE ORAL EVERY 4 HOURS PRN
Qty: 30 CAPSULE | Refills: 3 | Status: SHIPPED | OUTPATIENT
Start: 2017-08-24 | End: 2017-09-23

## 2017-08-24 RX ORDER — ROPINIROLE 0.25 MG/1
0.25 TABLET, FILM COATED ORAL
COMMUNITY
Start: 2017-07-13 | End: 2018-10-17 | Stop reason: SDUPTHER

## 2017-08-24 NOTE — PROGRESS NOTES
Subjective:       Patient ID: Shar Hummel is a 47 y.o. male.    Chief Complaint: Headache    HPI     HA  Pt reports that he has had a HA since 4 days ago.   Pain is to right temporal region.   Radiates towards the back.   No vision changes.   He has taken Ibuprofen and excedrin which has not been helpful.   Affects ability to sleep.   Worse with light.   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   No focal weakness.   There has been some dizziness since his surgery.     Review of Systems   Constitutional: Negative for activity change and unexpected weight change.   HENT: Positive for hearing loss. Negative for rhinorrhea and trouble swallowing.    Eyes: Negative for discharge and visual disturbance.   Respiratory: Negative for chest tightness and wheezing.    Cardiovascular: Negative for chest pain and palpitations.   Gastrointestinal: Negative for blood in stool, constipation, diarrhea and vomiting.   Endocrine: Negative for polydipsia and polyuria.   Genitourinary: Negative for difficulty urinating, hematuria and urgency.   Musculoskeletal: Negative for arthralgias, joint swelling and neck pain.   Neurological: Positive for headaches. Negative for weakness.   Psychiatric/Behavioral: Negative for confusion and dysphoric mood.       Objective:      Physical Exam   Constitutional: He appears well-developed and well-nourished. No distress.   Obese male in NAD.   HENT:   Head: Normocephalic and atraumatic.   Mouth/Throat: Oropharynx is clear and moist. No oropharyngeal exudate.   Eyes: EOM are normal. Pupils are equal, round, and reactive to light.   Neck: Normal range of motion. Neck supple. No thyromegaly present.   Cardiovascular: Normal rate, regular rhythm, normal heart sounds and intact distal pulses.    Pulmonary/Chest: Effort normal and breath sounds normal. No respiratory distress. He has no wheezes.   Abdominal: Soft. Bowel sounds are normal. He exhibits no distension and  no mass. There is no tenderness.   Musculoskeletal: He exhibits no edema.   Lymphadenopathy:     He has no cervical adenopathy.   Neurological: He is alert. No cranial nerve deficit.   Normal gait.    Skin: Skin is warm. No rash noted. No erythema.   Psychiatric: He has a normal mood and affect. His behavior is normal.   Vitals reviewed.      Assessment:       1. Tension headache        Plan:       1. Tension headache  Possibly 2/2 recent vestibular/acustic neuroma procedure.   Symptomatic tx at this point.   Pt will seen his neuroENT in 2 weeks.   Advised to contact clinic in case symptoms worsen or do not resolve over the next 1 week.   Neuro exam benign.   - butalbital-aspirin-caffeine -40 mg (FIORINAL) -40 mg Cap; Take 1 capsule by mouth every 4 (four) hours as needed.  Dispense: 30 capsule; Refill: 3    Portions of this note were created using Dragon voice recognition software. There may be voice recognition errors found in the text, and attempts were made to correct these errors prior to signature    Neil Sheldon MD    Family Medicine  8/24/2017

## 2017-08-24 NOTE — PATIENT INSTRUCTIONS
Weight Management: Getting Started  Healthy bodies come in all shapes and sizes. Not all bodies are made to be thin. For some people, a healthy weight is higher than the average weight listed on weight charts. Your healthcare provider can help you decide on a healthy weight for you.    Reasons to lose weight  Losing weight can help with some health problems, such as high blood pressure, heart disease, diabetes, sleep apnea, and arthritis. You may also feel more energy.  Set your long-term goal  Your goal doesn't even have to be a specific weight. You may decide on a fitness goal (such as being able to walk 10 miles a week), or a health goal (such as lowering your blood pressure). Choose a goal that is measurable and reasonable, so you know when you've reached it. A goal of reaching a BMI of less than 25 is not always reasonable (or possible).   Make an action plan  Habits dont change overnight. Setting your goals too high can leave you feeling discouraged if you cant reach them. Be realistic. Choose one or two small changes you can make now. Set an action plan for how you are going to make these changes. When you can stick to this plan, keep making a few more small changes. Taking small steps will help you stay on the path to success.  Track your progress  Write down your goals. Then, keep a daily record of your progress. Write down what you eat and how active you are. This record lets you look back on how much youve done. It may also help when youre feeling frustrated. Reward yourself for success. Even if you dont reach every goal, give yourself credit for what you do get done.  Get support  Encouragement from others can help make losing weight easier. Ask your family members and friends for support. They may even want to join you. Also look to your healthcare provider, registered dietitian, and  for help. Your local hospital can give you more information about nutrition, exercise, and  weight loss.  Date Last Reviewed: 1/31/2016  © 3120-3064 [employer]. [address] [place] [address], [place] [address]. All rights reserved. This information is not intended as a substitute for professional medical care. Always follow your healthcare professional's instructions.

## 2017-10-12 DIAGNOSIS — I10 ESSENTIAL HYPERTENSION: ICD-10-CM

## 2017-10-12 RX ORDER — LOSARTAN POTASSIUM 50 MG/1
TABLET ORAL
Qty: 90 TABLET | Refills: 0 | Status: SHIPPED | OUTPATIENT
Start: 2017-10-12 | End: 2018-01-13 | Stop reason: SDUPTHER

## 2017-11-21 ENCOUNTER — DOCUMENTATION ONLY (OUTPATIENT)
Dept: FAMILY MEDICINE | Facility: CLINIC | Age: 47
End: 2017-11-21

## 2017-11-21 ENCOUNTER — OFFICE VISIT (OUTPATIENT)
Dept: FAMILY MEDICINE | Facility: CLINIC | Age: 47
End: 2017-11-21
Payer: COMMERCIAL

## 2017-11-21 VITALS
BODY MASS INDEX: 41.2 KG/M2 | SYSTOLIC BLOOD PRESSURE: 122 MMHG | HEIGHT: 66 IN | DIASTOLIC BLOOD PRESSURE: 70 MMHG | WEIGHT: 256.38 LBS

## 2017-11-21 DIAGNOSIS — I10 ESSENTIAL HYPERTENSION: ICD-10-CM

## 2017-11-21 DIAGNOSIS — Z00.00 WELLNESS EXAMINATION: Primary | ICD-10-CM

## 2017-11-21 DIAGNOSIS — F32.A DEPRESSION, UNSPECIFIED DEPRESSION TYPE: ICD-10-CM

## 2017-11-21 PROCEDURE — 99214 OFFICE O/P EST MOD 30 MIN: CPT | Mod: S$GLB,,, | Performed by: FAMILY MEDICINE

## 2017-11-21 PROCEDURE — 99999 PR PBB SHADOW E&M-EST. PATIENT-LVL II: CPT | Mod: PBBFAC,,, | Performed by: FAMILY MEDICINE

## 2017-11-21 NOTE — PROGRESS NOTES
Pre-Visit Chart Review  For Appointment Scheduled on (11/21    Health Maintenance Due   Topic Date Due    TETANUS VACCINE  08/04/1989    Influenza Vaccine  08/01/2017

## 2017-11-21 NOTE — PROGRESS NOTES
Subjective:       Patient ID: Shar Hummel is a 47 y.o. male.    Chief Complaint: Follow-up    HPI     Annual Exam  Pt reports to the clinic for a wellness exam.   Currently, pt is without complaint.   The pt has a medical history which includes HTN.  As far as smoking is concerned, the pt denies.   The pt has not been attempting to maintain a healthy diet and engages in regular exercise.   Consistent seatbelt usage reported.   Pt has no symptoms of depression.     Review of Systems   Constitutional: Negative for activity change and unexpected weight change.   HENT: Positive for hearing loss. Negative for rhinorrhea and trouble swallowing.    Eyes: Positive for discharge. Negative for visual disturbance.   Respiratory: Negative for chest tightness and wheezing.    Cardiovascular: Negative for chest pain and palpitations.   Gastrointestinal: Negative for blood in stool, constipation, diarrhea and vomiting.   Endocrine: Negative for polydipsia and polyuria.   Genitourinary: Negative for difficulty urinating, hematuria and urgency.   Musculoskeletal: Negative for arthralgias, joint swelling and neck pain.   Neurological: Negative for weakness and headaches.   Psychiatric/Behavioral: Negative for confusion.       Objective:      Physical Exam   Constitutional: He appears well-developed and well-nourished. No distress.   Obese male in no acute distress.   HENT:   Head: Normocephalic and atraumatic.   Mouth/Throat: Oropharynx is clear and moist. No oropharyngeal exudate.   Eyes: EOM are normal. Pupils are equal, round, and reactive to light.   Neck: Normal range of motion. Neck supple. No thyromegaly present.   Cardiovascular: Normal rate, regular rhythm, normal heart sounds and intact distal pulses.    Pulmonary/Chest: Effort normal and breath sounds normal. No respiratory distress. He has no wheezes.   Abdominal: Soft. Bowel sounds are normal. He exhibits no distension and no mass. There is no tenderness.    Musculoskeletal: He exhibits no edema.   Lymphadenopathy:     He has no cervical adenopathy.   Neurological: He is alert.   Skin: Skin is warm. No rash noted. No erythema.   Psychiatric: He has a normal mood and affect. His behavior is normal.   Vitals reviewed.      Assessment:       1. Wellness examination    2. BMI 40.0-44.9, adult    3. Essential hypertension    4. Depression, unspecified depression type        Plan:       1. Wellness examination    2. BMI 40.0-44.9, adult  Patient has been advised to continue to maintain a healthy lifestyle, including regular exercise and consuming a well balanced diet.   Pt provided with [employer] Handout for consideration.   He plans to improve diet and increase the amount of exercise.   F/u in 6 months.     3. Essential hypertension  Condition currently stable. No changes to medication regimen on today.     4. Depression, unspecified depression type  Condition currently stable. No changes to medication regimen on today.

## 2018-01-13 DIAGNOSIS — I10 ESSENTIAL HYPERTENSION: ICD-10-CM

## 2018-01-15 RX ORDER — LOSARTAN POTASSIUM 50 MG/1
TABLET ORAL
Qty: 90 TABLET | Refills: 0 | Status: SHIPPED | OUTPATIENT
Start: 2018-01-15 | End: 2018-02-10 | Stop reason: ALTCHOICE

## 2018-01-21 DIAGNOSIS — I10 ESSENTIAL HYPERTENSION: ICD-10-CM

## 2018-01-22 RX ORDER — LOSARTAN POTASSIUM 50 MG/1
TABLET ORAL
Qty: 90 TABLET | Refills: 0 | Status: SHIPPED | OUTPATIENT
Start: 2018-01-22 | End: 2018-10-17 | Stop reason: SDUPTHER

## 2018-02-10 ENCOUNTER — TELEPHONE (OUTPATIENT)
Dept: FAMILY MEDICINE | Facility: CLINIC | Age: 48
End: 2018-02-10

## 2018-02-10 ENCOUNTER — HOSPITAL ENCOUNTER (OUTPATIENT)
Dept: RADIOLOGY | Facility: HOSPITAL | Age: 48
Discharge: HOME | End: 2018-02-10
Attending: NURSE PRACTITIONER
Payer: COMMERCIAL

## 2018-02-10 ENCOUNTER — OFFICE VISIT (OUTPATIENT)
Dept: FAMILY MEDICINE | Facility: CLINIC | Age: 48
End: 2018-02-10
Payer: COMMERCIAL

## 2018-02-10 VITALS
HEART RATE: 93 BPM | SYSTOLIC BLOOD PRESSURE: 130 MMHG | BODY MASS INDEX: 41.2 KG/M2 | DIASTOLIC BLOOD PRESSURE: 88 MMHG | WEIGHT: 256.38 LBS | HEIGHT: 66 IN | TEMPERATURE: 98 F

## 2018-02-10 DIAGNOSIS — N20.0 NEPHROLITHIASIS: Primary | ICD-10-CM

## 2018-02-10 DIAGNOSIS — R31.9 HEMATURIA, UNSPECIFIED TYPE: ICD-10-CM

## 2018-02-10 DIAGNOSIS — I10 ESSENTIAL HYPERTENSION: ICD-10-CM

## 2018-02-10 DIAGNOSIS — R31.9 HEMATURIA, UNSPECIFIED TYPE: Primary | ICD-10-CM

## 2018-02-10 LAB
BILIRUB SERPL-MCNC: ABNORMAL MG/DL
BILIRUB UR QL STRIP: NEGATIVE
BLOOD URINE, POC: ABNORMAL
CLARITY UR REFRACT.AUTO: CLEAR
COLOR UR AUTO: ABNORMAL
COLOR, POC UA: ABNORMAL
GLUCOSE UR QL STRIP: ABNORMAL
GLUCOSE UR QL STRIP: NEGATIVE
HGB UR QL STRIP: ABNORMAL
KETONES UR QL STRIP: ABNORMAL
KETONES UR QL STRIP: ABNORMAL
LEUKOCYTE ESTERASE UR QL STRIP: NEGATIVE
LEUKOCYTE ESTERASE URINE, POC: ABNORMAL
MICROSCOPIC COMMENT: NORMAL
NITRITE UR QL STRIP: NEGATIVE
NITRITE, POC UA: ABNORMAL
PH UR STRIP: 6 [PH] (ref 5–8)
PH, POC UA: 6
PROT UR QL STRIP: NEGATIVE
PROTEIN, POC: ABNORMAL
RBC #/AREA URNS AUTO: 4 /HPF (ref 0–4)
SP GR UR STRIP: 1 (ref 1–1.03)
SPECIFIC GRAVITY, POC UA: 1
URN SPEC COLLECT METH UR: ABNORMAL
UROBILINOGEN UR STRIP-ACNC: NEGATIVE EU/DL
UROBILINOGEN, POC UA: ABNORMAL
WBC #/AREA URNS AUTO: 1 /HPF (ref 0–5)

## 2018-02-10 PROCEDURE — 74176 CT ABD & PELVIS W/O CONTRAST: CPT | Mod: 26,,, | Performed by: RADIOLOGY

## 2018-02-10 PROCEDURE — 87086 URINE CULTURE/COLONY COUNT: CPT

## 2018-02-10 PROCEDURE — 81002 URINALYSIS NONAUTO W/O SCOPE: CPT | Mod: S$GLB,,, | Performed by: NURSE PRACTITIONER

## 2018-02-10 PROCEDURE — 74176 CT ABD & PELVIS W/O CONTRAST: CPT | Mod: TC

## 2018-02-10 PROCEDURE — 99999 PR PBB SHADOW E&M-EST. PATIENT-LVL IV: CPT | Mod: PBBFAC,,, | Performed by: NURSE PRACTITIONER

## 2018-02-10 PROCEDURE — 3008F BODY MASS INDEX DOCD: CPT | Mod: S$GLB,,, | Performed by: NURSE PRACTITIONER

## 2018-02-10 PROCEDURE — 81001 URINALYSIS AUTO W/SCOPE: CPT

## 2018-02-10 PROCEDURE — 99213 OFFICE O/P EST LOW 20 MIN: CPT | Mod: 25,S$GLB,, | Performed by: NURSE PRACTITIONER

## 2018-02-10 RX ORDER — TAMSULOSIN HYDROCHLORIDE 0.4 MG/1
0.4 CAPSULE ORAL DAILY
Qty: 30 CAPSULE | Refills: 0 | Status: SHIPPED | OUTPATIENT
Start: 2018-02-10 | End: 2018-03-09

## 2018-02-10 RX ORDER — ETODOLAC 500 MG/1
500 TABLET, FILM COATED ORAL 2 TIMES DAILY PRN
Qty: 60 TABLET | Refills: 0 | Status: SHIPPED | OUTPATIENT
Start: 2018-02-10 | End: 2018-03-09

## 2018-02-12 LAB — BACTERIA UR CULT: NO GROWTH

## 2018-02-12 NOTE — PROGRESS NOTES
Subjective:       Patient ID: Shar Hummel is a 47 y.o. male.    Chief Complaint: Hematuria    Hematuria   This is a new problem. The current episode started yesterday. The problem is unchanged. He describes the hematuria as gross hematuria. The hematuria occurs during the initial portion of his urinary stream. He reports clotting at the beginning of his urine stream. The pain is mild. He describes his urine color as bright red. Irritative symptoms do not include frequency or urgency. Associated symptoms include dysuria. Pertinent negatives include no chills, fever, flank pain, hesitancy, nausea or vomiting. His past medical history is significant for kidney stones. There is no history of STDs.   Dysuria    This is a new problem. The current episode started yesterday. The problem occurs every urination. The problem has been gradually worsening. The quality of the pain is described as burning. The pain is at a severity of 3/10. The pain is mild. There has been no fever. He is sexually active. There is a history of pyelonephritis. Associated symptoms include hematuria and weight loss. Pertinent negatives include no behavior changes, chills, discharge, flank pain, frequency, hesitancy, nausea, possible pregnancy, sweats, urgency, vomiting, constipation, rash or withholding. He has tried nothing for the symptoms. The treatment provided no relief. His past medical history is significant for hypertension and kidney stones. There is no history of catheterization, diabetes insipidus, diabetes mellitus, genitourinary reflux, recurrent UTIs, a single kidney, STD, urinary stasis or a urological procedure.     Review of Systems   Constitutional: Positive for weight loss. Negative for chills and fever.   Gastrointestinal: Negative for constipation, nausea and vomiting.   Genitourinary: Positive for dysuria and hematuria. Negative for flank pain, frequency, hesitancy and urgency.   Skin: Negative for rash.       Objective:       Physical Exam   Constitutional: He is oriented to person, place, and time. He appears well-developed and well-nourished. No distress.   HENT:   Head: Normocephalic and atraumatic.   Right Ear: External ear normal.   Left Ear: External ear normal.   Mouth/Throat: Oropharynx is clear and moist. No oropharyngeal exudate.   Eyes: Pupils are equal, round, and reactive to light. Right eye exhibits no discharge. Left eye exhibits no discharge.   Neck: Neck supple. No thyromegaly present.   Cardiovascular: Normal rate and regular rhythm.  Exam reveals no gallop and no friction rub.    No murmur heard.  Pulmonary/Chest: Effort normal and breath sounds normal. No respiratory distress. He has no wheezes. He has no rales.   Abdominal: Soft. He exhibits no distension. There is no tenderness. There is no CVA tenderness.   Lymphadenopathy:     He has no cervical adenopathy.   Neurological: He is alert and oriented to person, place, and time. Coordination normal.   Skin: Skin is warm and dry.   Psychiatric: He has a normal mood and affect. His behavior is normal. Thought content normal.   Vitals reviewed.          Current Outpatient Prescriptions:     losartan (COZAAR) 50 MG tablet, TAKE 1 TABLET BY MOUTH DAILY, Disp: 90 tablet, Rfl: 0    ropinirole (REQUIP) 0.25 MG tablet, Take 0.25 mg by mouth., Disp: , Rfl:     tizanidine (ZANAFLEX) 4 MG tablet, Take 1 tablet (4 mg total) by mouth every 6 (six) hours as needed., Disp: 90 tablet, Rfl: 1    VIMOVO 500-20 mg TbID, Take 1 tablet by mouth 2 (two) times daily. , Disp: , Rfl:     etodolac (LODINE) 500 MG tablet, Take 1 tablet (500 mg total) by mouth 2 (two) times daily as needed., Disp: 60 tablet, Rfl: 0    tamsulosin (FLOMAX) 0.4 mg Cp24, Take 1 capsule (0.4 mg total) by mouth once daily., Disp: 30 capsule, Rfl: 0  Assessment:       1. Hematuria, unspecified type    2. Essential hypertension        Plan:       Hematuria, unspecified type  -     POCT URINE DIPSTICK WITHOUT  MICROSCOPE  -     Urine culture; Future; Expected date: 02/10/2018  -     Urinalysis; Future; Expected date: 02/10/2018  -     CT Renal Stone Study ABD Pelvis WO; Future; Expected date: 02/10/2018  -     Ambulatory referral to Urology    Essential hypertension  Stable on current medication.    Patient readiness: acceptance and barriers:none    During the course of the visit the patient was educated and counseled about the following:     Hypertension:   Medication: no change.    Goals: Hypertension: Reduce Blood Pressure    Did patient meet goals/outcomes: Yes    The following self management tools provided: declined    Patient Instructions (the written plan) was given to the patient/family.     Time spent with patient: 15 minutes    Barriers to medications present (no )    Adverse reactions to current medications (no)    Over the counter medications reviewed (Yes)

## 2018-02-16 ENCOUNTER — OFFICE VISIT (OUTPATIENT)
Dept: UROLOGY | Facility: CLINIC | Age: 48
End: 2018-02-16
Payer: COMMERCIAL

## 2018-02-16 VITALS
TEMPERATURE: 98 F | HEIGHT: 66 IN | HEART RATE: 99 BPM | SYSTOLIC BLOOD PRESSURE: 146 MMHG | DIASTOLIC BLOOD PRESSURE: 100 MMHG | BODY MASS INDEX: 38.87 KG/M2 | WEIGHT: 241.88 LBS

## 2018-02-16 DIAGNOSIS — R31.0 GROSS HEMATURIA: Primary | ICD-10-CM

## 2018-02-16 DIAGNOSIS — Z87.442 PERSONAL HISTORY OF KIDNEY STONES: ICD-10-CM

## 2018-02-16 LAB
BACTERIA #/AREA URNS HPF: ABNORMAL /HPF
BILIRUB SERPL-MCNC: ABNORMAL MG/DL
BLOOD URINE, POC: ABNORMAL
COLOR, POC UA: YELLOW
GLUCOSE UR QL STRIP: ABNORMAL
KETONES UR QL STRIP: ABNORMAL
LEUKOCYTE ESTERASE URINE, POC: ABNORMAL
MICROSCOPIC COMMENT: ABNORMAL
NITRITE, POC UA: ABNORMAL
PH, POC UA: 5
PROTEIN, POC: ABNORMAL
RBC #/AREA URNS HPF: 2 /HPF (ref 0–4)
SPECIFIC GRAVITY, POC UA: 1.02
SQUAMOUS #/AREA URNS HPF: 8 /HPF
UROBILINOGEN, POC UA: ABNORMAL
WBC #/AREA URNS HPF: 3 /HPF (ref 0–5)

## 2018-02-16 PROCEDURE — 88112 CYTOPATH CELL ENHANCE TECH: CPT | Performed by: PATHOLOGY

## 2018-02-16 PROCEDURE — 99999 PR PBB SHADOW E&M-EST. PATIENT-LVL IV: CPT | Mod: PBBFAC,,, | Performed by: NURSE PRACTITIONER

## 2018-02-16 PROCEDURE — 81001 URINALYSIS AUTO W/SCOPE: CPT | Mod: S$GLB,,, | Performed by: NURSE PRACTITIONER

## 2018-02-16 PROCEDURE — 81000 URINALYSIS NONAUTO W/SCOPE: CPT

## 2018-02-16 PROCEDURE — 99203 OFFICE O/P NEW LOW 30 MIN: CPT | Mod: 25,S$GLB,, | Performed by: NURSE PRACTITIONER

## 2018-02-16 PROCEDURE — 3008F BODY MASS INDEX DOCD: CPT | Mod: S$GLB,,, | Performed by: NURSE PRACTITIONER

## 2018-02-16 NOTE — LETTER
February 16, 2018      Kristina Freed, [facility]  [address]  [place] [address]           [place] [facility]  [address] [place] [address] [address]  [place] [address]  Phone: [phone]  Fax: [phone]          Patient: Shar Hummel   MR Number: [id number]   YOB: 1970   Date of Visit: 2/16/2018       Dear Kristina Freed:    Thank you for referring Shar Hummel to me for evaluation. Attached you will find relevant portions of my assessment and plan of care.    If you have questions, please do not hesitate to call me. I look forward to following Shar Hummel along with you.    Sincerely,    aMribell Shoemaker, [facility]    Enclosure  CC:  No Recipients    If you would like to receive this communication electronically, please contact [email or web address] or [phone] to request more information on "[employer] Link access.    For providers and/or their staff who would like to refer a patient to [facility], please contact us through our one-stop-shop provider referral line, [facility], at [phone].    If you feel you have received this communication in error or would no longer like to receive these types of communications, please e-mail [email or web address]

## 2018-02-16 NOTE — PROGRESS NOTES
"[facility] Note  Staff: BARRY Manley    Referring provider and please cc: BARRY Perez  PCP: Dr. Neil Sheldon    Chief Complaint: Gross hematuria, hx of kidney stones    Subjective:        HPI: Shar Hummel is a 47 y.o. male NEW PATIENT to Urology clinic presents today for further evaluation of recent onset of gross hematuria which began on 02/08/18.  The patient INITIALLY saw his PCP clinic for evaluation on 02/10/2018:  --Recent Urine Culture done on 02/10/18 showed no growth.  --Urine Cytology not done  --Microscopic UA on 02/10/18:  4 RBCs, 1 WBCs, He was started on Flomax 0.4 mg one tablet daily for unknown? Kidney stone?    Pt did start a diet on 02/05/18: Ketogenic diet, low carbs.  TODAY, he is no longer having gross hematuria.  Last visual was over one week ago.    The patient was last seen by Dr. JENNY Barnard on 10/15/2013 for left renal colic.  Pt stated he passed a stone last-20 years ago.      FAMILY HISTORY: Maternal uncle in his "60s" was diagnosed with prostate cancer.    CT RENAL STONE STUDY ABD PELVIS WO done on 02/10/2018:  Findings:   1.  There is a 6 mm nonobstructing calculus in the mid to superior pole of the right kidney.  There is no hydronephrosis.  *This could be the same stone seen on previous CT in 2013 with no changes noted.  2.  The bladder is incompletely distended and therefore suboptimally evaluated.  There is, however, no bladder calculus identified.  3.  Previously demonstrated 6 mm nodule in the right lower lobe is no longer present.  4.  A normal appendix is present.    REVIEW OF SYSTEMS:  Review of Systems   Constitutional: Negative for chills, diaphoresis, fever and weight loss.   HENT: Negative for congestion, hearing loss, nosebleeds and sore throat.    Eyes: Negative for blurred vision and pain.        Wears glasses   Respiratory: Negative for cough and wheezing.    Cardiovascular: Negative for chest pain, palpitations and leg " swelling.   Gastrointestinal: Negative for abdominal pain, heartburn, nausea and vomiting.   Genitourinary: Positive for hematuria. Negative for dysuria, flank pain, frequency and urgency.        Personal history of kidney stones.   Musculoskeletal: Negative for back pain, joint pain, myalgias and neck pain.   Skin: Negative for itching and rash.   Neurological: Negative for dizziness, tremors, sensory change, seizures, loss of consciousness, weakness and headaches.   Endo/Heme/Allergies: Does not bruise/bleed easily.   Psychiatric/Behavioral: Negative for depression and suicidal ideas. The patient is not nervous/anxious.      Physical Exam    PMHx:  Past Medical History:   Diagnosis Date    Cervical disc disorder 2/11/2016    Hypertension     Kidney stone     x 1 (passed) analyzed: calcium oxalate     PSHx:  Past Surgical History:   Procedure Laterality Date    CIRCUMCISION, PRIMARY       Allergies:  Betamethasone; Bupropion hcl; Neuromuscular blockers, steroidal; Sulfa (sulfonamide antibiotics); Sulfamethoxazole-trimethoprim; and Gabapentin    Medications: reviewed   Anticoagulation: No    Objective:     Vitals:    02/16/18 1004   BP: (!) 146/100   Pulse: 99   Temp: 98.1 °F (36.7 °C)     General:WDWN in NAD  Eyes: PERRLA, normal conjunctiva  Respiratory: no increased work on breathing, clear to auscultation  Cardiovascular: regular rate and rhythm. No obvious extremity edema.  GI: palpation of masses. No tenderness. No hepatosplenomegaly to palpation.  Musculoskeletal: normal range of motion of bilateral upper extremities. Normal muscle strength and tone.  Skin: no obvious rashes or lesions. No tightening of skin noted.  Neurologic: CN grossly normal. Normal sensation.   Psychiatric: awake, alert and oriented x 3. Mood and affect normal. Cooperative.    LABS REVIEW:  UA today:  Color, UA yellow    Spec Grav UA 1.020    pH, UA 5    WBC, UA neg    Nitrite, UA neg    Protein neg    Glucose, UA norm    Ketones, UA  ++    Urobilinogen, UA norm    Bilirubin ++    Blood, UA neg      UCx:   Results for orders placed or performed in visit on 02/10/18   Urine culture   Result Value Ref Range    Urine Culture, Routine No growth      Cr:   Lab Results   Component Value Date    CREATININE 1.0 05/30/2017     Assessment:       1. Gross hematuria    2. Personal history of kidney stones          Plan:   Gross hematuria episode r/t new diet changes/dehydration/vs. Infection?  Microscopic UA and urine cytology to be performed.  [employer] orders/info thoroughly explained to pt during ov today.    F/u The patient will contact our office if his symptoms reoccur or worsen.  We will call him after we obtain his lab results next week.    [facility]: Active    Maribell Shoemaker, BARRY

## 2018-02-22 ENCOUNTER — PATIENT MESSAGE (OUTPATIENT)
Dept: FAMILY MEDICINE | Facility: CLINIC | Age: 48
End: 2018-02-22

## 2018-02-22 ENCOUNTER — PATIENT MESSAGE (OUTPATIENT)
Dept: UROLOGY | Facility: CLINIC | Age: 48
End: 2018-02-22

## 2018-03-09 ENCOUNTER — OFFICE VISIT (OUTPATIENT)
Dept: FAMILY MEDICINE | Facility: CLINIC | Age: 48
End: 2018-03-09
Payer: COMMERCIAL

## 2018-03-09 VITALS
HEART RATE: 95 BPM | BODY MASS INDEX: 37.35 KG/M2 | SYSTOLIC BLOOD PRESSURE: 120 MMHG | DIASTOLIC BLOOD PRESSURE: 82 MMHG | WEIGHT: 232.38 LBS | HEIGHT: 66 IN

## 2018-03-09 DIAGNOSIS — F32.A DEPRESSION, UNSPECIFIED DEPRESSION TYPE: ICD-10-CM

## 2018-03-09 DIAGNOSIS — I10 ESSENTIAL HYPERTENSION: Primary | ICD-10-CM

## 2018-03-09 PROCEDURE — 99214 OFFICE O/P EST MOD 30 MIN: CPT | Mod: S$GLB,,, | Performed by: FAMILY MEDICINE

## 2018-03-09 PROCEDURE — 99999 PR PBB SHADOW E&M-EST. PATIENT-LVL III: CPT | Mod: PBBFAC,,, | Performed by: FAMILY MEDICINE

## 2018-03-09 PROCEDURE — 3079F DIAST BP 80-89 MM HG: CPT | Mod: S$GLB,,, | Performed by: FAMILY MEDICINE

## 2018-03-09 PROCEDURE — 3074F SYST BP LT 130 MM HG: CPT | Mod: S$GLB,,, | Performed by: FAMILY MEDICINE

## 2018-03-09 NOTE — PROGRESS NOTES
Subjective:       Patient ID: Shar Hummel is a 47 y.o. male.    Chief Complaint: Annual Exam (need labs)    HPI     Follow up  Pt is here to follow up multiple chronic medical conditions.   Pt does reports compliance with medications.   The pt has a current PMH of depression, HTN and obesity.  As it relates to exercise, the pt reports that he does not routinely exercise.   As far as smoking is concerned, the pt denies.   Home BP measurements have been 120's/80's.  Pt has been making attempts at eating healthy.  Health maintenance addressed and updated in chart.    Review of Systems   Constitutional: Negative for activity change and unexpected weight change.   HENT: Negative for hearing loss, rhinorrhea and trouble swallowing.    Eyes: Negative for discharge and visual disturbance.   Respiratory: Negative for chest tightness and wheezing.    Cardiovascular: Negative for chest pain and palpitations.   Gastrointestinal: Negative for blood in stool, constipation, diarrhea and vomiting.   Endocrine: Negative for polydipsia and polyuria.   Genitourinary: Negative for difficulty urinating, hematuria and urgency.   Musculoskeletal: Negative for arthralgias, joint swelling and neck pain.   Neurological: Negative for weakness and headaches.   Psychiatric/Behavioral: Negative for confusion and dysphoric mood.       Objective:      Physical Exam   Constitutional: He appears well-developed and well-nourished. No distress.   Obese male in no acute distress.   HENT:   Head: Normocephalic and atraumatic.   Mouth/Throat: Oropharynx is clear and moist. No oropharyngeal exudate.   Eyes: EOM are normal. Pupils are equal, round, and reactive to light.   Neck: Normal range of motion. Neck supple. No thyromegaly present.   Cardiovascular: Normal rate, regular rhythm, normal heart sounds and intact distal pulses.    Pulmonary/Chest: Effort normal and breath sounds normal. No respiratory distress. He has no wheezes.   Abdominal: Soft.  Bowel sounds are normal. He exhibits no distension and no mass. There is no tenderness.   Musculoskeletal: He exhibits no edema.   Lymphadenopathy:     He has no cervical adenopathy.   Neurological: He is alert.   Skin: Skin is warm. No rash noted. No erythema.   Psychiatric: He has a normal mood and affect. His behavior is normal.   Vitals reviewed.      Assessment:       1. Essential hypertension    2. BMI 40.0-44.9, adult    3. Depression, unspecified depression type        Plan:       1. Essential hypertension  Condition currently stable. No changes to medication regimen on today.   - TSH; Future  - Comprehensive metabolic panel; Future    2. BMI 40.0-44.9, adult  Patient has been advised to continue to maintain a healthy lifestyle, including regular exercise and consuming a well balanced diet.   - TSH; Future  - Lipid panel; Future  - Comprehensive metabolic panel; Future    3. Depression, unspecified depression type  Condition stable.   - Comprehensive metabolic panel; Future  - HIV-1 and HIV-2 antibodies; Future    Patient readiness: acceptance and barriers:none    During the course of the visit the patient was educated and counseled about the following:     Hypertension:   Dietary sodium restriction.  Regular aerobic exercise.  Obesity:   Informal exercise measures discussed, e.g. taking stairs instead of elevator.  Regular aerobic exercise program discussed.    Goals: Hypertension: Reduce Blood Pressure and Obesity: Reduce calorie intake and BMI    Did patient meet goals/outcomes: No    The following self management tools provided: blood pressure log  excercise log    Patient Instructions (the written plan) was given to the patient/family.     Time spent with patient: 15 minutes    Portions of this note were created using Dragon voice recognition software. There may be voice recognition errors found in the text, and attempts were made to correct these errors prior to signature    Neil Sheldon  MD    Family Medicine  3/9/2018

## 2018-03-10 ENCOUNTER — LAB VISIT (OUTPATIENT)
Dept: LAB | Facility: HOSPITAL | Age: 48
End: 2018-03-10
Attending: FAMILY MEDICINE
Payer: COMMERCIAL

## 2018-03-10 DIAGNOSIS — I10 ESSENTIAL HYPERTENSION: ICD-10-CM

## 2018-03-10 DIAGNOSIS — F32.A DEPRESSION, UNSPECIFIED DEPRESSION TYPE: ICD-10-CM

## 2018-03-10 LAB
ALBUMIN SERPL BCP-MCNC: 4.3 G/DL
ALP SERPL-CCNC: 60 U/L
ALT SERPL W/O P-5'-P-CCNC: 37 U/L
ANION GAP SERPL CALC-SCNC: 14 MMOL/L
AST SERPL-CCNC: 24 U/L
BILIRUB SERPL-MCNC: 0.8 MG/DL
BUN SERPL-MCNC: 10 MG/DL
CALCIUM SERPL-MCNC: 9.6 MG/DL
CHLORIDE SERPL-SCNC: 103 MMOL/L
CHOLEST SERPL-MCNC: 174 MG/DL
CHOLEST/HDLC SERPL: 6.7 {RATIO}
CO2 SERPL-SCNC: 25 MMOL/L
CREAT SERPL-MCNC: 1 MG/DL
EST. GFR  (AFRICAN AMERICAN): >60 ML/MIN/1.73 M^2
EST. GFR  (NON AFRICAN AMERICAN): >60 ML/MIN/1.73 M^2
GLUCOSE SERPL-MCNC: 74 MG/DL
HDLC SERPL-MCNC: 26 MG/DL
HDLC SERPL: 14.9 %
LDLC SERPL CALC-MCNC: 115.6 MG/DL
NONHDLC SERPL-MCNC: 148 MG/DL
POTASSIUM SERPL-SCNC: 4.5 MMOL/L
PROT SERPL-MCNC: 7 G/DL
SODIUM SERPL-SCNC: 142 MMOL/L
TRIGL SERPL-MCNC: 162 MG/DL
TSH SERPL DL<=0.005 MIU/L-ACNC: 1.9 UIU/ML

## 2018-03-10 PROCEDURE — 80053 COMPREHEN METABOLIC PANEL: CPT

## 2018-03-10 PROCEDURE — 86703 HIV-1/HIV-2 1 RESULT ANTBDY: CPT

## 2018-03-10 PROCEDURE — 80061 LIPID PANEL: CPT

## 2018-03-10 PROCEDURE — 84443 ASSAY THYROID STIM HORMONE: CPT

## 2018-03-10 PROCEDURE — 36415 COLL VENOUS BLD VENIPUNCTURE: CPT | Mod: PO

## 2018-03-12 LAB — HIV 1+2 AB+HIV1 P24 AG SERPL QL IA: NEGATIVE

## 2018-03-15 ENCOUNTER — TELEPHONE (OUTPATIENT)
Dept: FAMILY MEDICINE | Facility: CLINIC | Age: 48
End: 2018-03-15

## 2018-03-15 NOTE — TELEPHONE ENCOUNTER
----- Message from Bonnie Villatoro sent at 3/13/2018  4:28 PM CDT -----  Contact: pt  Pt states that nurse Frederic called him and he is returning her call..[phone] (home)

## 2018-05-05 DIAGNOSIS — I10 ESSENTIAL HYPERTENSION: ICD-10-CM

## 2018-05-07 RX ORDER — LOSARTAN POTASSIUM 50 MG/1
TABLET ORAL
Qty: 90 TABLET | Refills: 0 | Status: SHIPPED | OUTPATIENT
Start: 2018-05-07 | End: 2018-05-08 | Stop reason: SDUPTHER

## 2018-05-08 DIAGNOSIS — I10 ESSENTIAL HYPERTENSION: ICD-10-CM

## 2018-05-08 RX ORDER — LOSARTAN POTASSIUM 50 MG/1
50 TABLET ORAL DAILY
Qty: 90 TABLET | Refills: 0 | Status: SHIPPED | OUTPATIENT
Start: 2018-05-08 | End: 2018-11-26 | Stop reason: SDUPTHER

## 2018-10-17 ENCOUNTER — OFFICE VISIT (OUTPATIENT)
Dept: FAMILY MEDICINE | Facility: CLINIC | Age: 48
End: 2018-10-17
Payer: COMMERCIAL

## 2018-10-17 VITALS
TEMPERATURE: 98 F | WEIGHT: 197 LBS | HEART RATE: 79 BPM | OXYGEN SATURATION: 98 % | DIASTOLIC BLOOD PRESSURE: 80 MMHG | SYSTOLIC BLOOD PRESSURE: 132 MMHG | HEIGHT: 66 IN | BODY MASS INDEX: 31.66 KG/M2

## 2018-10-17 DIAGNOSIS — G25.81 RESTLESS LEG SYNDROME, CONTROLLED: ICD-10-CM

## 2018-10-17 DIAGNOSIS — M50.90 CERVICAL DISC DISORDER: ICD-10-CM

## 2018-10-17 DIAGNOSIS — I10 HYPERTENSION, UNSPECIFIED TYPE: Primary | ICD-10-CM

## 2018-10-17 DIAGNOSIS — Z13.220 LIPID SCREENING: ICD-10-CM

## 2018-10-17 PROCEDURE — 3008F BODY MASS INDEX DOCD: CPT | Mod: ,,, | Performed by: FAMILY MEDICINE

## 2018-10-17 PROCEDURE — 3075F SYST BP GE 130 - 139MM HG: CPT | Mod: ,,, | Performed by: FAMILY MEDICINE

## 2018-10-17 PROCEDURE — 3079F DIAST BP 80-89 MM HG: CPT | Mod: ,,, | Performed by: FAMILY MEDICINE

## 2018-10-17 PROCEDURE — 99214 OFFICE O/P EST MOD 30 MIN: CPT | Mod: ,,, | Performed by: FAMILY MEDICINE

## 2018-10-17 RX ORDER — ROPINIROLE 0.25 MG/1
0.25 TABLET, FILM COATED ORAL NIGHTLY
Qty: 30 TABLET | Refills: 3 | Status: SHIPPED | OUTPATIENT
Start: 2018-10-17

## 2018-10-17 RX ORDER — TIZANIDINE 4 MG/1
4 TABLET ORAL EVERY 6 HOURS PRN
Qty: 90 TABLET | Refills: 0 | Status: SHIPPED | OUTPATIENT
Start: 2018-10-17

## 2018-11-05 LAB
BUN SERPL-MCNC: 16 MG/DL (ref 8–20)
CALCIUM SERPL-MCNC: 9.3 MG/DL (ref 7.7–10.4)
CHLORIDE: 102 MMOL/L (ref 98–110)
CO2 SERPL-SCNC: 30 MMOL/L (ref 22.8–31.6)
CREATININE: 0.84 MG/DL (ref 0.6–1.4)
GLUCOSE: 100 MG/DL (ref 70–99)
POTASSIUM SERPL-SCNC: 4.5 MMOL/L (ref 3.5–5)
SODIUM: 140 MMOL/L (ref 134–144)

## 2018-11-06 ENCOUNTER — PATIENT MESSAGE (OUTPATIENT)
Dept: FAMILY MEDICINE | Facility: CLINIC | Age: 48
End: 2018-11-06

## 2018-11-10 ENCOUNTER — PATIENT MESSAGE (OUTPATIENT)
Dept: FAMILY MEDICINE | Facility: CLINIC | Age: 48
End: 2018-11-10

## 2018-11-18 ENCOUNTER — PATIENT MESSAGE (OUTPATIENT)
Dept: FAMILY MEDICINE | Facility: CLINIC | Age: 48
End: 2018-11-18

## 2018-11-19 DIAGNOSIS — I10 ESSENTIAL HYPERTENSION: ICD-10-CM

## 2018-11-21 ENCOUNTER — OFFICE VISIT (OUTPATIENT)
Dept: FAMILY MEDICINE | Facility: CLINIC | Age: 48
End: 2018-11-21
Payer: COMMERCIAL

## 2018-11-21 VITALS
TEMPERATURE: 99 F | HEIGHT: 66 IN | BODY MASS INDEX: 31.68 KG/M2 | DIASTOLIC BLOOD PRESSURE: 82 MMHG | OXYGEN SATURATION: 99 % | HEART RATE: 89 BPM | SYSTOLIC BLOOD PRESSURE: 106 MMHG | WEIGHT: 197.13 LBS

## 2018-11-21 DIAGNOSIS — M54.9 CVA TENDERNESS: ICD-10-CM

## 2018-11-21 DIAGNOSIS — R21 MACULOPAPULAR RASH: ICD-10-CM

## 2018-11-21 DIAGNOSIS — T14.8XXA MUSCLE STRAIN: Primary | ICD-10-CM

## 2018-11-21 DIAGNOSIS — I10 ESSENTIAL HYPERTENSION: ICD-10-CM

## 2018-11-21 LAB
BILIRUB UR QL STRIP: NEGATIVE
GLUCOSE UR QL STRIP: NEGATIVE
KETONES UR QL STRIP: NEGATIVE
LEUKOCYTE ESTERASE UR QL STRIP: NEGATIVE
PH, POC UA: 5.5 (ref 5–8.5)
POC BLOOD, URINE: NEGATIVE
POC NITRATES, URINE: NEGATIVE
PROT UR QL STRIP: NEGATIVE
SP GR UR STRIP: 1.02 (ref 1–1.03)
UROBILINOGEN UR STRIP-ACNC: 1 (ref 0.2–8)

## 2018-11-21 PROCEDURE — 81003 URINALYSIS AUTO W/O SCOPE: CPT | Mod: QW,,, | Performed by: NURSE PRACTITIONER

## 2018-11-21 PROCEDURE — 99214 OFFICE O/P EST MOD 30 MIN: CPT | Mod: 25,,, | Performed by: NURSE PRACTITIONER

## 2018-11-21 PROCEDURE — 3079F DIAST BP 80-89 MM HG: CPT | Mod: ,,, | Performed by: NURSE PRACTITIONER

## 2018-11-21 PROCEDURE — 3074F SYST BP LT 130 MM HG: CPT | Mod: ,,, | Performed by: NURSE PRACTITIONER

## 2018-11-21 PROCEDURE — 3008F BODY MASS INDEX DOCD: CPT | Mod: ,,, | Performed by: NURSE PRACTITIONER

## 2018-11-21 RX ORDER — METHOCARBAMOL 500 MG/1
500 TABLET, FILM COATED ORAL 3 TIMES DAILY
Qty: 30 TABLET | Refills: 0 | Status: SHIPPED | OUTPATIENT
Start: 2018-11-21 | End: 2018-12-01

## 2018-11-21 NOTE — PATIENT INSTRUCTIONS
Muscle Strain in the Abdomen  A muscle strain is a stretching or tearing of the muscle fibers. It is also called a pulled muscle. The abdomen is protected by a thick wall of muscle in the front and sides. These muscles help with twisting and bending forward. Too much coughing, lifting heavy objects, or sudden jerking movements can sometimes cause a muscle strain in the abdomen. This causes pain that is worse when you move. The area may also feel tender or look swollen and bruised.  Home care  · Apply an ice pack over the injured area for 15 to 20 minutes every 3 to 6 hours. You should do this for the first 24 to 48 hours. You can make an ice pack by filling a plastic bag that seals at the top with ice cubes and then wrapping it with a thin towel. Be careful not to injure your skin with the ice treatments. Ice should never be applied directly to skin. Continue the use of ice packs for relief of pain and swelling as needed. After 48 hours, apply heat (warm shower or warm bath) for 15 to 20 minutes several times a day, or alternate ice and heat.  · You may use over-the-counter pain medicine to control pain, unless another pain medicine was prescribed. If you have liver or kidney disease, a stomach ulcer or GI bleeding, talk with your healthcare provider before using these medicines.  Follow-up care  Follow up with your healthcare provider, or as advised.  Call 911  Call 911 if you have:  · Weakness, lightheaded, or faint  · Chest pain  When to seek medical advice  Call your healthcare provider right away if any of these occur:  · Pain gets worse or moves to the right lower abdomen, just below the waistline  · Fever of 100.4°F (38°C) or above lasting for 24 to 48 hours  · Vomiting  · Severe abdominal pain that spreads to the back or toward the groin  · Blood in the urine  · Unexpected vaginal bleeding in women  Date Last Reviewed: 11/19/2015  © 0079-1046 [employer] [address] [place] [address], [place]  [address]. All rights reserved. This information is not intended as a substitute for professional medical care. Always follow your healthcare professional's instructions.

## 2018-11-21 NOTE — PROGRESS NOTES
"    SUBJECTIVE:      Patient ID: Shar Hummel is a 48 y.o. male.    Chief Complaint: No chief complaint on file.    Sick visit - pt of Dr. Mcdonald    Started last night with pain on L side of abd, thought it was from working out, denies doing any abd exercises, doesn't recall injury/pop/pull, states he took some "leftover hydrocodone" last night so he could sleep but was only able to rest for about 2 hours before the pain woke him      Abdominal Pain   This is a new problem. The current episode started yesterday. The onset quality is undetermined. The problem occurs constantly. The most recent episode lasted 18 hours. The problem has been gradually worsening. The pain is located in the LUQ and left flank. The pain is at a severity of 7/10. The pain is moderate. The quality of the pain is sharp. The abdominal pain radiates to the LUQ, back and left flank. Associated symptoms include anorexia and myalgias. Pertinent negatives include no arthralgias, belching, constipation, diarrhea, dysuria, fever, flatus, frequency, headaches, hematochezia, hematuria, melena, nausea, vomiting or weight loss. The pain is aggravated by certain positions and deep breathing. The pain is relieved by being still, certain positions and palpation. He has tried oral narcotic analgesics for the symptoms. The treatment provided mild relief. There is no history of abdominal surgery, colon cancer, Crohn's disease, gallstones, GERD, irritable bowel syndrome, pancreatitis, PUD or ulcerative colitis. Patient's medical history includes kidney stones. Patient's medical history does not include UTI.   Hypertension   This is a chronic problem. The current episode started more than 1 year ago. The problem is controlled. Pertinent negatives include no anxiety, blurred vision, chest pain, headaches, malaise/fatigue, neck pain, orthopnea, palpitations, peripheral edema, PND, shortness of breath or sweats. Risk factors for coronary artery disease " include obesity, male gender, sedentary lifestyle and family history. Past treatments include angiotensin blockers. The current treatment provides significant improvement. Compliance problems include exercise and diet.    Rash   This is a chronic problem. The current episode started more than 1 month ago. The problem is unchanged. The affected locations include the abdomen, back and torso. The rash is characterized by redness and itchiness. Associated symptoms include anorexia. Pertinent negatives include no congestion, cough, diarrhea, eye pain, facial edema, fatigue, fever, joint pain, nail changes, rhinorrhea, shortness of breath, sore throat or vomiting. Past treatments include topical steroids and antihistamine. The treatment provided mild relief.       Past Surgical History:   Procedure Laterality Date    acoustic neuroma surgery      BRAIN SURGERY      CIRCUMCISION, PRIMARY       Family History   Problem Relation Age of Onset    Diabetes Father     Heart disease Father     Hypertension Father       Social History     Socioeconomic History    Marital status:      Spouse name: None    Number of children: None    Years of education: None    Highest education level: None   Social Needs    Financial resource strain: None    Food insecurity - worry: None    Food insecurity - inability: None    Transportation needs - medical: None    Transportation needs - non-medical: None   Occupational History    Occupation:      Employer: [employer]   Tobacco Use    Smoking status: Never Smoker    Smokeless tobacco: Never Used   Substance and Sexual Activity    Alcohol use: Yes     Comment: seldom    Drug use: No    Sexual activity: Yes     Partners: Female   Other Topics Concern    None   Social History Narrative    None     Current Outpatient Medications   Medication Sig Dispense Refill    losartan (COZAAR) 50 MG tablet Take 1 tablet (50 mg total) by mouth once  daily. 90 tablet 0    rOPINIRole (REQUIP) 0.25 MG tablet Take 1 tablet (0.25 mg total) by mouth every evening. 30 tablet 3    tiZANidine (ZANAFLEX) 4 MG tablet Take 1 tablet (4 mg total) by mouth every 6 (six) hours as needed. 90 tablet 0    methocarbamol (ROBAXIN) 500 MG Tab Take 1 tablet (500 mg total) by mouth 3 (three) times daily. for 10 days 30 tablet 0     No current facility-administered medications for this visit.      Review of patient's allergies indicates:   Allergen Reactions    Betamethasone Other (See Comments)     Couldn't stand after getting injection  Loss of control of lower body    Bupropion hcl Hives     Other reaction(s): Hives    Neuromuscular blockers, steroidal      Other reaction(s): Muscle pain    Sulfa (sulfonamide antibiotics) Other (See Comments) and Hives     headaches    Sulfamethoxazole-trimethoprim Other (See Comments)    Gabapentin Rash      Past Medical History:   Diagnosis Date    Cervical disc disorder 2/11/2016    Hypertension     Kidney stone     x 1 (passed) analyzed: calcium oxalate     Past Surgical History:   Procedure Laterality Date    acoustic neuroma surgery      BRAIN SURGERY      CIRCUMCISION, PRIMARY         Review of Systems   Constitutional: Negative for activity change, appetite change, fatigue, fever, malaise/fatigue and weight loss.   HENT: Negative for congestion, ear pain, hearing loss, postnasal drip, rhinorrhea, sinus pressure, sinus pain, sneezing and sore throat.    Eyes: Negative for blurred vision, photophobia and pain.   Respiratory: Negative for cough, chest tightness, shortness of breath and wheezing.    Cardiovascular: Negative for chest pain, palpitations, orthopnea, leg swelling and PND.   Gastrointestinal: Positive for abdominal pain and anorexia. Negative for abdominal distention, blood in stool, constipation, diarrhea, flatus, hematochezia, melena, nausea and vomiting.   Endocrine: Negative for cold intolerance, heat intolerance,  "polydipsia and polyuria.   Genitourinary: Negative for difficulty urinating, dysuria, flank pain, frequency, hematuria and urgency.   Musculoskeletal: Positive for myalgias. Negative for arthralgias, back pain, joint pain, joint swelling and neck pain.   Skin: Positive for rash (abd). Negative for nail changes and pallor.   Allergic/Immunologic: Negative for environmental allergies and food allergies.   Neurological: Negative for dizziness, weakness, light-headedness and headaches.   Hematological: Does not bruise/bleed easily.   Psychiatric/Behavioral: Negative for confusion, decreased concentration and sleep disturbance. The patient is not nervous/anxious.       OBJECTIVE:      Vitals:    11/21/18 1643   BP: 106/82   Pulse: 89   Temp: 99.3 °F (37.4 °C)   SpO2: 99%   Weight: 89.4 kg (197 lb 1.6 oz)   Height: 5' 6" (1.676 m)     Physical Exam   Constitutional: He is oriented to person, place, and time. Vital signs are normal. He appears well-developed and well-nourished. No distress.   obese   HENT:   Head: Normocephalic and atraumatic.   Right Ear: Hearing normal.   Left Ear: Hearing normal.   Nose: Nose normal. No rhinorrhea.   Mouth/Throat: Mucous membranes are normal.   Eyes: Conjunctivae and lids are normal. Pupils are equal, round, and reactive to light. Right eye exhibits no discharge. Left eye exhibits no discharge. Right conjunctiva is not injected. Left conjunctiva is not injected. Right pupil is round and reactive. Left pupil is round and reactive. Pupils are equal.   Neck: Trachea normal and normal range of motion. Neck supple. No JVD present. No tracheal deviation present. No thyromegaly present.   Cardiovascular: Normal rate, regular rhythm, normal heart sounds and intact distal pulses. Exam reveals no gallop and no friction rub.   No murmur heard.  Pulses:       Radial pulses are 2+ on the right side, and 2+ on the left side.   Pulmonary/Chest: Effort normal and breath sounds normal. No stridor. No " respiratory distress. He has no decreased breath sounds. He has no wheezes. He has no rhonchi. He has no rales.   Abdominal: Soft. Bowel sounds are normal. He exhibits no distension. There is no tenderness. There is CVA tenderness (L-sided). There is no rigidity and no guarding.       Musculoskeletal: Normal range of motion. He exhibits no edema.   Lymphadenopathy:     He has no cervical adenopathy.   Neurological: He is alert and oriented to person, place, and time. He has normal strength. He displays no atrophy. He displays a negative Romberg sign. Coordination and gait normal. GCS eye subscore is 4. GCS verbal subscore is 5. GCS motor subscore is 6.   Skin: Skin is warm and dry. Capillary refill takes less than 2 seconds. Rash noted. No lesion noted. Rash is maculopapular. No cyanosis. No pallor.   Psychiatric: He has a normal mood and affect. His speech is normal and behavior is normal. Judgment and thought content normal. Cognition and memory are normal. He is attentive.   Nursing note and vitals reviewed.     Assessment:       1. Muscle strain    2. CVA tenderness    3. Essential hypertension    4. Maculopapular rash    5. BMI 31.0-31.9,adult        Plan:       Muscle strain  -     methocarbamol (ROBAXIN) 500 MG Tab; Take 1 tablet (500 mg total) by mouth 3 (three) times daily. for 10 days  Dispense: 30 tablet; Refill: 0    CVA tenderness  -     POCT Urinalysis, Dipstick, Automated, W/O Scope    Essential hypertension   -stable on med    Maculopapular rash   -already has appt scheduled with derm   - encouraged to continue to apply prescription steroid cream he's been using & PO benadryl for itch until he sees derm    BMI 31.0-31.9,adult   -The patient is asked to make an attempt to improve diet and exercise patterns to aid in medical management of this problem.        Follow-up if symptoms worsen or fail to improve.      11/25/2018 ANNE Otto, FNP-C

## 2018-11-26 DIAGNOSIS — I10 ESSENTIAL HYPERTENSION: ICD-10-CM

## 2018-11-26 RX ORDER — LOSARTAN POTASSIUM 50 MG/1
50 TABLET ORAL DAILY
Qty: 90 TABLET | Refills: 1 | OUTPATIENT
Start: 2018-11-26

## 2018-11-26 RX ORDER — LOSARTAN POTASSIUM 50 MG/1
TABLET ORAL
Qty: 90 TABLET | Refills: 0 | Status: SHIPPED | OUTPATIENT
Start: 2018-11-26 | End: 2019-06-04

## 2019-05-13 ENCOUNTER — PATIENT MESSAGE (OUTPATIENT)
Dept: FAMILY MEDICINE | Facility: CLINIC | Age: 49
End: 2019-05-13

## 2019-06-04 ENCOUNTER — OFFICE VISIT (OUTPATIENT)
Dept: ALLERGY | Facility: CLINIC | Age: 49
End: 2019-06-04
Payer: COMMERCIAL

## 2019-06-04 VITALS
HEIGHT: 66 IN | DIASTOLIC BLOOD PRESSURE: 78 MMHG | SYSTOLIC BLOOD PRESSURE: 120 MMHG | WEIGHT: 198 LBS | BODY MASS INDEX: 31.82 KG/M2

## 2019-06-04 DIAGNOSIS — L20.89 OTHER ATOPIC DERMATITIS: ICD-10-CM

## 2019-06-04 DIAGNOSIS — R21 RASH AND NONSPECIFIC SKIN ERUPTION: Primary | ICD-10-CM

## 2019-06-04 DIAGNOSIS — R10.84 GENERALIZED ABDOMINAL PAIN: ICD-10-CM

## 2019-06-04 PROCEDURE — 99245 OFF/OP CONSLTJ NEW/EST HI 55: CPT | Mod: ,,, | Performed by: ALLERGY & IMMUNOLOGY

## 2019-06-04 PROCEDURE — 99245 PR OFFICE CONSULTATION,LEVEL V: ICD-10-PCS | Mod: ,,, | Performed by: ALLERGY & IMMUNOLOGY

## 2019-06-04 RX ORDER — DOXEPIN HYDROCHLORIDE 10 MG/1
10 CAPSULE ORAL NIGHTLY PRN
Qty: 30 CAPSULE | Refills: 2 | Status: SHIPPED | OUTPATIENT
Start: 2019-06-04 | End: 2019-07-04

## 2019-06-04 RX ORDER — LISINOPRIL 20 MG/1
20 TABLET ORAL DAILY
COMMUNITY

## 2019-06-04 RX ORDER — CETIRIZINE HYDROCHLORIDE 10 MG/1
10 TABLET ORAL DAILY
COMMUNITY

## 2019-06-04 RX ORDER — TACROLIMUS 1 MG/G
OINTMENT TOPICAL 2 TIMES DAILY
Qty: 100 G | Refills: 6 | Status: SHIPPED | OUTPATIENT
Start: 2019-06-04

## 2019-06-04 NOTE — PATIENT INSTRUCTIONS
Food:  Avoid egg x 4 week strict, if not better move to wheat.     Rash:   Clobetasol twice per day - spare the face and groin. - no longer than 2 weeks at a time.     protopic twice per day to three times per day on the affected area.     sarna over the counter can help.     If not getting better, we can try dupixent which is every 2 weeks subcutaneous and can do at home and /or the office.       Doxepin 10 mg at night for breakthrough itching.   Continue zyrtec 2 in the morning but may use 2 at night or just doxepin.     We can consider adding zantac 150 mg which is an H2 blocker and can help with itch  Montelukast 10 mg once per day

## 2019-06-04 NOTE — PROGRESS NOTES
"Subjective:       Patient ID: Shar Hummel is a 48 y.o. male.    Chief Complaint: Rash    HPI     Pt presents today as a consult form Dr Sheila sanchez rash.   Onset: 1 year ago.  Location: legs  Pt thinks that eating eggs is the trigger.   Patch testing is negative.   Pt recently had ray and rash started again.   Rash is pruritic, and can be any where except groin and face.   Tx: betamethasone does help "some" doesn't last if used as a spray.   Tried clobetasol.   No protopic or tacrolimus.   Possible wheat exposure on his keto diet.   He can have associated abdominal pain as well, most temporally related to egg.   Egg can have immediate rash in 2 hours.  Abdominal pain possibly delayed.   Zyrtec 20 mg po bid.       Has seen three dermatologist, most recent one was Dr. Sanchez.       Atopic Hx    Rhinitis: endorses mild  Oral allergy: denies   Food allergy: see above   Asthma: denies   Latex tolerates  Nsaids: tolerates  Eczema denies   Urticaria denies     Infection History n/a     Pneumonia # in the past 12 months:  Sinus infection # in the past 12 months:  Otitis infection # in the past 12 months:       Review of Systems      General: neg unexpected weight changes, fevers, chills, night sweats, malaise  HEENT: see hpi, Neg eye pain, vision changes, ear drainage, nose bleeds, throat tightness, sores in the mouth  CV: Neg chest pain, palpitations, swelling  Resp: see hpi, neg shortness of breath, hemoptysis, cough  GI: see hpi, neg dysphagia, night abdominal pain, reflux, chronic diarrhea, chronic constipation  Derm: See Hpi, neg new rash, neg flushing  Mu/sk: Neg joint pain, joint swelling   Psych: Neg anxiety  neuro: neg chronic headaches, muscle weakness  Endo: neg heat/cold intolerance, chronic fatigue      Objective:     Vitals:    06/04/19 1037   BP: 120/78   Weight: 89.8 kg (198 lb)   Height: 5' 6" (1.676 m)   PF: 700 L/min        Physical Exam        General: no acute distress, well developed " well nourished   HEENT:   Head:normocephalic atraumatic  Eyes: ARNOLD, EOMI, Neg injection, scleral icterus, or conjunctival papillary hypertrophy.  Ears: tm clear bilaterally, normal canal  Nose: 2-3+ inferior turbinates pink, neg nasal polyps            Mucosa: moist             Septal irritation: none   OP: mucus membranes moist, - cobblestoning, - PND, neg erythema or lesions  Neck: supple, Full range of motion, neg lymphadenopathy  Chest: full respiratory excursion no abnormal chest abnormality  Resp: clear to ascultation bilaterally  CV: RRR, neg MRG, brisk capillary refill  Abdomen: BS+, non tender, non distended, neg hepatosplenomegaly.   Ext:  Neg clubbing, cyanosis, pitting edema  Skin: dermatitis on bilateral thighs, left volar forearm surface.   Lymph: neg supraclavicular, axillary     Assessment:       1. Rash and nonspecific skin eruption    2. Generalized abdominal pain    3. Other atopic dermatitis        Plan:       Rash and nonspecific skin eruption  -     Egg, white IgE; Future; Expected date: 06/04/2019  -     Gluten IgE; Future; Expected date: 06/04/2019  -     Wheat IgE; Future; Expected date: 06/04/2019  -     tacrolimus (PROTOPIC) 0.1 % ointment; Apply topically 2 (two) times daily. Apply to affected area.  Dispense: 100 g; Refill: 6    Generalized abdominal pain  -     Gluten IgE; Future; Expected date: 06/04/2019  -     Celiac Disease Comprehensive Panel; Future; Expected date: 06/04/2019    Other atopic dermatitis  -     tacrolimus (PROTOPIC) 0.1 % ointment; Apply topically 2 (two) times daily. Apply to affected area.  Dispense: 100 g; Refill: 6  -     doxepin (SINEQUAN) 10 MG capsule; Take 1 capsule (10 mg total) by mouth nightly as needed (breakthrough itch).  Dispense: 30 capsule; Refill: 2      Food avoidance 4 weeks   Then consider dupixent if not better.  Doxepin for breakthrough itch. 10 mg po qhs prn   Continue hi dose zyrtec.   Food label given and substitues  [facility] labs.      Face  to face > 50% > 75 mins discussing rash, triggers, testing, and management.         Consuelo Herndon M.D.  Allergy/Immunology  [facility]   [phone] phone  [phone] fax

## 2019-06-04 NOTE — LETTER
June 4, 2019      Sheila Sanchez MD  [address]  [address]  [place] [address]           [facility] - Allergy  [address]  [address]  [place] [address]  Phone: [phone]  Fax: [phone]          Patient: Shar Hummel   MR Number: [id number]   YOB: 1970   Date of Visit: 6/4/2019       Dear Dr. Sheila Sanchez:    Thank you for referring Shar Hummel to me for evaluation. Attached you will find relevant portions of my assessment and plan of care.    If you have questions, please do not hesitate to call me. I look forward to following Shar Hummel along with you.    Sincerely,    Consuelo Herndon MD    Enclosure  CC:  No Recipients    If you would like to receive this communication electronically, please contact [email or web address] or [phone] to request more information on [employer] access.    For providers and/or their staff who would like to refer a patient to [facility], please contact us through our one-stop-shop provider referral line, [facility] , at [phone].    If you feel you have received this communication in error or would no longer like to receive these types of communications, please e-mail [email or web address]

## 2019-06-07 ENCOUNTER — PATIENT MESSAGE (OUTPATIENT)
Dept: ALLERGY | Facility: CLINIC | Age: 49
End: 2019-06-07

## 2019-06-10 DIAGNOSIS — R21 RASH AND NONSPECIFIC SKIN ERUPTION: Primary | ICD-10-CM

## 2019-06-10 RX ORDER — METHYLPREDNISOLONE 4 MG/1
TABLET ORAL
Qty: 1 PACKAGE | Refills: 0 | Status: SHIPPED | OUTPATIENT
Start: 2019-06-10 | End: 2019-06-26

## 2019-06-17 ENCOUNTER — PATIENT MESSAGE (OUTPATIENT)
Dept: ALLERGY | Facility: CLINIC | Age: 49
End: 2019-06-17

## 2019-06-18 ENCOUNTER — PATIENT MESSAGE (OUTPATIENT)
Dept: ALLERGY | Facility: CLINIC | Age: 49
End: 2019-06-18

## 2019-06-26 ENCOUNTER — OFFICE VISIT (OUTPATIENT)
Dept: ALLERGY | Facility: CLINIC | Age: 49
End: 2019-06-26
Payer: COMMERCIAL

## 2019-06-26 ENCOUNTER — PATIENT MESSAGE (OUTPATIENT)
Dept: ALLERGY | Facility: CLINIC | Age: 49
End: 2019-06-26

## 2019-06-26 VITALS
SYSTOLIC BLOOD PRESSURE: 118 MMHG | WEIGHT: 205 LBS | BODY MASS INDEX: 32.95 KG/M2 | DIASTOLIC BLOOD PRESSURE: 70 MMHG | HEIGHT: 66 IN

## 2019-06-26 DIAGNOSIS — R21 RASH AND NONSPECIFIC SKIN ERUPTION: Primary | ICD-10-CM

## 2019-06-26 DIAGNOSIS — L29.9 PRURITUS: Primary | ICD-10-CM

## 2019-06-26 DIAGNOSIS — K90.49 FOOD INTOLERANCE: ICD-10-CM

## 2019-06-26 DIAGNOSIS — L29.9 PRURITUS: ICD-10-CM

## 2019-06-26 DIAGNOSIS — L20.84 INTRINSIC ATOPIC DERMATITIS: ICD-10-CM

## 2019-06-26 PROCEDURE — 3008F BODY MASS INDEX DOCD: CPT | Mod: ,,, | Performed by: ALLERGY & IMMUNOLOGY

## 2019-06-26 PROCEDURE — 99215 PR OFFICE/OUTPT VISIT, EST, LEVL V, 40-54 MIN: ICD-10-PCS | Mod: ,,, | Performed by: ALLERGY & IMMUNOLOGY

## 2019-06-26 PROCEDURE — 3078F DIAST BP <80 MM HG: CPT | Mod: ,,, | Performed by: ALLERGY & IMMUNOLOGY

## 2019-06-26 PROCEDURE — 3078F PR MOST RECENT DIASTOLIC BLOOD PRESSURE < 80 MM HG: ICD-10-PCS | Mod: ,,, | Performed by: ALLERGY & IMMUNOLOGY

## 2019-06-26 PROCEDURE — 3074F SYST BP LT 130 MM HG: CPT | Mod: ,,, | Performed by: ALLERGY & IMMUNOLOGY

## 2019-06-26 PROCEDURE — 99215 OFFICE O/P EST HI 40 MIN: CPT | Mod: ,,, | Performed by: ALLERGY & IMMUNOLOGY

## 2019-06-26 PROCEDURE — 3008F PR BODY MASS INDEX (BMI) DOCUMENTED: ICD-10-PCS | Mod: ,,, | Performed by: ALLERGY & IMMUNOLOGY

## 2019-06-26 PROCEDURE — 3074F PR MOST RECENT SYSTOLIC BLOOD PRESSURE < 130 MM HG: ICD-10-PCS | Mod: ,,, | Performed by: ALLERGY & IMMUNOLOGY

## 2019-06-26 RX ORDER — PERMETHRIN 50 MG/G
CREAM TOPICAL ONCE
Qty: 60 G | Refills: 1 | Status: SHIPPED | OUTPATIENT
Start: 2019-06-26 | End: 2019-06-26

## 2019-06-26 NOTE — PATIENT INSTRUCTIONS
Portal message me if you need anything extra for ex)    Yes I want to try plaquenil and dapsone instead of dupixent    Or very low prednisone doses till another med kicks in.    Or if anything is too expensive.     Find out what your body contacts a lot, like what is the mattress is made of.     Let me know any associations or patterns you recognize.     vanicream line is really good.     ? Oil of oregano     We can consider testing to milk     [facility] is pretty fast.

## 2019-06-26 NOTE — PROGRESS NOTES
"Subjective:       Patient ID: Shar Hummel is a 48 y.o. male.    Chief Complaint: Rash (went away while on steroids, but came back the evening that he took the last pill of the medrol dose pack )    HPI     Pt presents today for rash.   After his visit, he required a medrol dose pack.   Using increased antihistamines did not help him with pruritus.   After his regimen, his rash started to return.   All food labs are negative serum IgE.   Foods tested: egg, gluten, wheat   Negative celiac  Onset: 1 year ago.   Location: legs upper lateral thighs. Can be "anywhere except groin and face."   Pt thinks that eating eggs is the trigger. However, avoiding egg and lab negative, still occurs.   Patch testing is negative via derm.   History of eating ray and rash recurring.   Rash is pruritic  Tx: betamethasone does help "some" doesn't last if used as a spray.   Tried clobetasol. Not helpful.   Tacrolimus- not helpful.   Possible wheat exposure on his keto diet.   He can have associated abdominal pain as well, most temporally related to egg.   Egg can have immediate rash in 2 hours.  Abdominal pain possibly delayed.   Zyrtec 20 mg po bid has not been helpful with zantac 150 mg po bid.     Has seen three dermatologists, most recent one was Dr. Sanchez.     Pt reports yesterday tested for STI.     Atopic Hx    Rhinitis: endorses mild  Oral allergy: denies   Food allergy: see above   Asthma: denies   Latex tolerates  Nsaids: tolerates  Eczema denies   Urticaria denies     Infection History n/a     Pneumonia # in the past 12 months:  Sinus infection # in the past 12 months:  Otitis infection # in the past 12 months:       Review of Systems      General: neg unexpected weight changes, fevers, chills, night sweats, malaise  HEENT: see hpi, Neg eye pain, vision changes, ear drainage, nose bleeds, throat tightness, sores in the mouth  CV: Neg chest pain, palpitations, swelling  Resp: see hpi, neg shortness of breath, " "hemoptysis, cough  GI: see hpi, neg dysphagia, night abdominal pain, reflux, chronic diarrhea, chronic constipation  Derm: See Hpi, neg flushing  Mu/sk: Neg joint pain, joint swelling   Psych: Neg anxiety  neuro: neg chronic headaches, muscle weakness  Endo: neg heat/cold intolerance, chronic fatigue      Objective:     Vitals:    06/26/19 0947   BP: 118/70   Weight: 93 kg (205 lb)   Height: 5' 6" (1.676 m)        Physical Exam        General: no acute distress, well developed well nourished   Skin: dermatitis on bilateral thighs, left volar forearm surface. Right and left flank erythematous xerotic plaques that judy with palpation.   Lymph: neg supraclavicular, axillary     Assessment:       1. Rash and nonspecific skin eruption    2. Food intolerance    3. Intrinsic atopic dermatitis    4. Pruritus        Plan:       Rash and nonspecific skin eruption  -     permethrin (ELIMITE) 5 % cream; Apply topically once. for 1 dose  Dispense: 60 g; Refill: 1    Food intolerance    Intrinsic atopic dermatitis  -     dupilumab (DUPIXENT) 300 mg/2 mL Syrg; 600 mg loading dose then 300 mg q 2 weeks subcutaneous.  Dispense: 4 mL; Refill: 11  -     lidocaine HCl-menthol 4-1 % Crea; Apply 1 application topically 2 (two) times daily.  Dispense: 120 g; Refill: 2    Pruritus  -     lidocaine HCl-menthol 4-1 % Crea; Apply 1 application topically 2 (two) times daily.  Dispense: 120 g; Refill: 2  -     permethrin (ELIMITE) 5 % cream; Apply topically once. for 1 dose  Dispense: 60 g; Refill: 1      Food avoidance 4 weeks did not help  Start dupixent 300 mg q 2 weeks   Doxepin for breakthrough itch. 10 mg po qhs prn - was tolerated, but not as efficacious for itch.   Continue hi dose zyrtec and zantac   Food label given and substitutes reviewed  [facility] labs egg, gluten, celiac, wheat were negative.   Start permethrin 5%   Lidocaine prn   Continue protopic     Body surface area >50%- arms, legs, abdomen anterior and posterior extremities " with flanks, ankles, feet bilaterally.     Face to face > 50% of the visit > 40 mins discussing care and management , exposure, and future steps.         Consuelo Herndon M.D.  Allergy/Immunology  [facility]   [phone] phone  [phone] fax

## 2019-06-27 DIAGNOSIS — L20.84 INTRINSIC ATOPIC DERMATITIS: ICD-10-CM

## 2019-06-29 ENCOUNTER — PATIENT MESSAGE (OUTPATIENT)
Dept: ALLERGY | Facility: CLINIC | Age: 49
End: 2019-06-29

## 2019-07-08 ENCOUNTER — PATIENT MESSAGE (OUTPATIENT)
Dept: ALLERGY | Facility: CLINIC | Age: 49
End: 2019-07-08

## 2019-07-08 DIAGNOSIS — L20.84 INTRINSIC ATOPIC DERMATITIS: Primary | ICD-10-CM

## 2019-07-10 ENCOUNTER — PATIENT MESSAGE (OUTPATIENT)
Dept: ALLERGY | Facility: CLINIC | Age: 49
End: 2019-07-10

## 2019-07-15 ENCOUNTER — PATIENT MESSAGE (OUTPATIENT)
Dept: ALLERGY | Facility: CLINIC | Age: 49
End: 2019-07-15

## 2019-07-16 DIAGNOSIS — L30.9 DERMATITIS: Primary | ICD-10-CM

## 2019-07-16 RX ORDER — METHYLPREDNISOLONE 4 MG/1
TABLET ORAL
Qty: 1 PACKAGE | Refills: 1 | Status: SHIPPED | OUTPATIENT
Start: 2019-07-16 | End: 2019-08-06

## 2019-07-18 ENCOUNTER — PATIENT MESSAGE (OUTPATIENT)
Dept: ALLERGY | Facility: CLINIC | Age: 49
End: 2019-07-18

## 2019-07-22 ENCOUNTER — PATIENT MESSAGE (OUTPATIENT)
Dept: ALLERGY | Facility: CLINIC | Age: 49
End: 2019-07-22

## 2019-07-30 ENCOUNTER — PATIENT MESSAGE (OUTPATIENT)
Dept: ALLERGY | Facility: CLINIC | Age: 49
End: 2019-07-30

## 2019-07-30 DIAGNOSIS — L20.84 INTRINSIC ATOPIC DERMATITIS: ICD-10-CM

## 2019-07-30 RX ORDER — DUPILUMAB 300 MG/2ML
INJECTION, SOLUTION SUBCUTANEOUS
Qty: 4 ML | Refills: 0 | Status: SHIPPED | OUTPATIENT
Start: 2019-07-30 | End: 2020-05-07

## 2019-08-02 DIAGNOSIS — L20.84 INTRINSIC ATOPIC DERMATITIS: Primary | ICD-10-CM

## 2019-08-02 RX ORDER — BETAMETHASONE DIPROPIONATE 0.5 MG/G
OINTMENT TOPICAL 2 TIMES DAILY
Qty: 45 G | Refills: 3 | Status: SHIPPED | OUTPATIENT
Start: 2019-08-02

## 2019-08-13 ENCOUNTER — PATIENT MESSAGE (OUTPATIENT)
Dept: ALLERGY | Facility: CLINIC | Age: 49
End: 2019-08-13

## 2019-08-16 DIAGNOSIS — L20.89 OTHER ATOPIC DERMATITIS: Primary | ICD-10-CM

## 2019-10-04 ENCOUNTER — PATIENT MESSAGE (OUTPATIENT)
Dept: ALLERGY | Facility: CLINIC | Age: 49
End: 2019-10-04

## 2019-10-04 DIAGNOSIS — Z91.040 HISTORY OF ALLERGY TO LATEX: Primary | ICD-10-CM

## 2019-10-09 ENCOUNTER — PATIENT MESSAGE (OUTPATIENT)
Dept: ALLERGY | Facility: CLINIC | Age: 49
End: 2019-10-09

## 2019-10-09 LAB — LTX IGE QN: <0.1 KU/L

## 2019-10-28 ENCOUNTER — PATIENT MESSAGE (OUTPATIENT)
Dept: ALLERGY | Facility: CLINIC | Age: 49
End: 2019-10-28

## 2019-10-31 ENCOUNTER — PATIENT MESSAGE (OUTPATIENT)
Dept: ALLERGY | Facility: CLINIC | Age: 49
End: 2019-10-31

## 2019-11-01 ENCOUNTER — OFFICE VISIT (OUTPATIENT)
Dept: ALLERGY | Facility: CLINIC | Age: 49
End: 2019-11-01
Payer: COMMERCIAL

## 2019-11-01 VITALS
HEIGHT: 66 IN | WEIGHT: 220 LBS | SYSTOLIC BLOOD PRESSURE: 118 MMHG | BODY MASS INDEX: 35.36 KG/M2 | DIASTOLIC BLOOD PRESSURE: 72 MMHG

## 2019-11-01 DIAGNOSIS — L30.9 DERMATITIS: Primary | ICD-10-CM

## 2019-11-01 DIAGNOSIS — L27.2 FOOD INDUCED DERMATITIS: ICD-10-CM

## 2019-11-01 PROCEDURE — 3008F BODY MASS INDEX DOCD: CPT | Mod: S$GLB,,, | Performed by: ALLERGY & IMMUNOLOGY

## 2019-11-01 PROCEDURE — 3078F PR MOST RECENT DIASTOLIC BLOOD PRESSURE < 80 MM HG: ICD-10-PCS | Mod: S$GLB,,, | Performed by: ALLERGY & IMMUNOLOGY

## 2019-11-01 PROCEDURE — 3074F PR MOST RECENT SYSTOLIC BLOOD PRESSURE < 130 MM HG: ICD-10-PCS | Mod: S$GLB,,, | Performed by: ALLERGY & IMMUNOLOGY

## 2019-11-01 PROCEDURE — 99214 OFFICE O/P EST MOD 30 MIN: CPT | Mod: S$GLB,,, | Performed by: ALLERGY & IMMUNOLOGY

## 2019-11-01 PROCEDURE — 3074F SYST BP LT 130 MM HG: CPT | Mod: S$GLB,,, | Performed by: ALLERGY & IMMUNOLOGY

## 2019-11-01 PROCEDURE — 99214 PR OFFICE/OUTPT VISIT, EST, LEVL IV, 30-39 MIN: ICD-10-PCS | Mod: S$GLB,,, | Performed by: ALLERGY & IMMUNOLOGY

## 2019-11-01 PROCEDURE — 3008F PR BODY MASS INDEX (BMI) DOCUMENTED: ICD-10-PCS | Mod: S$GLB,,, | Performed by: ALLERGY & IMMUNOLOGY

## 2019-11-01 PROCEDURE — 3078F DIAST BP <80 MM HG: CPT | Mod: S$GLB,,, | Performed by: ALLERGY & IMMUNOLOGY

## 2019-11-01 RX ORDER — EMTRICITABINE AND TENOFOVIR DISOPROXIL FUMARATE 200; 300 MG/1; MG/1
TABLET, FILM COATED ORAL
COMMUNITY
Start: 2019-10-17

## 2019-11-01 RX ORDER — PREDNISONE 20 MG/1
20 TABLET ORAL DAILY
Qty: 30 TABLET | Refills: 1 | Status: SHIPPED | OUTPATIENT
Start: 2019-11-01 | End: 2019-11-05

## 2019-11-01 NOTE — PATIENT INSTRUCTIONS
basalm of [place]u and nickel ingestion are possibilities for systemic contact dermatitis.     [place] etc can do it as well. There are food lists that show the offending agents.     Food logging should help.     Prednisone 20 mg x 3-4 days then you can just stop.  See if this helps.     Then avoid eggs    Consider wheat and milk too.

## 2019-11-01 NOTE — PROGRESS NOTES
"Subjective:       Patient ID: Shar Hummel is a 49 y.o. male.    Chief Complaint: Rash (wants to discuss Dupixent - he has been doing the injection at home since August/September and he does not feel like it is helping the rash.  He has figured out the eggs are the cause of the rash.  )    HPI     Pt presents today for rash.     Pt has been on dupixent and this seems to help with itch.   He is on zyrtec 40 mg daily and 40 mg pepcid.   Would like to stop dupixent for lack of efficacy.     After his visit, he required a medrol dose pack.   Using increased antihistamines did not help him with pruritus.   After his regimen, his rash started to return.   All food labs are negative serum IgE.   Foods tested: egg, gluten, wheat   Negative celiac  Onset: 1 year ago.   Location: legs upper lateral thighs. Can be "anywhere except groin and face."   Pt thinks that eating eggs is the trigger. However, avoiding egg and lab negative, still occurs.   Patch testing is negative via derm.   History of eating ray and rash recurring.   Rash is pruritic  Tx: betamethasone does help "some" doesn't last if used as a spray.   Tried clobetasol. Not helpful.   Tacrolimus- not helpful.   Possible wheat exposure on his keto diet.   He can have associated abdominal pain as well, most temporally related to egg.   Egg can have immediate rash in 2 hours.  Abdominal pain possibly delayed.   Zyrtec 20 mg po bid has not been helpful with zantac 150 mg po bid.     Has seen three dermatologists, most recent one was Dr. Sanchez.     Pt reports yesterday tested for STI.     Atopic Hx    Rhinitis: endorses mild  Oral allergy: denies   Food allergy: see above   Asthma: denies   Latex tolerates  Nsaids: tolerates  Eczema denies   Urticaria denies     Infection History n/a     Pneumonia # in the past 12 months:  Sinus infection # in the past 12 months:  Otitis infection # in the past 12 months:           General: neg unexpected weight changes, " "fevers, chills, night sweats, malaise  HEENT: see hpi, Neg eye pain, vision changes, ear drainage, nose bleeds, throat tightness, sores in the mouth  CV: Neg chest pain, palpitations, swelling  Resp: see hpi, neg shortness of breath, hemoptysis, cough  GI: see hpi, neg dysphagia, night abdominal pain, reflux, chronic diarrhea, chronic constipation  Derm: See Hpi, neg flushing  Mu/sk: Neg joint pain, joint swelling   Psych: Neg anxiety  neuro: neg chronic headaches, muscle weakness  Endo: neg heat/cold intolerance, chronic fatigue      Objective:     Vitals:    11/01/19 1119   BP: 118/72   Weight: 99.8 kg (220 lb)   Height: 5' 6" (1.676 m)        Physical Exam        General: no acute distress, well developed well nourished   Skin: dermatitis on bilateral thighs, left volar forearm surface. Right and left flank erythematous xerotic plaques that judy with palpation.   Lymph: neg supraclavicular, axillary     Assessment:       1. Dermatitis    2. Food induced dermatitis        Plan:       Dermatitis  -     predniSONE (DELTASONE) 20 MG tablet; Take 1 tablet (20 mg total) by mouth once daily. for 4 days  Dispense: 30 tablet; Refill: 1    Food induced dermatitis  -     predniSONE (DELTASONE) 20 MG tablet; Take 1 tablet (20 mg total) by mouth once daily. for 4 days  Dispense: 30 tablet; Refill: 1      Food avoidance eggs, delayed association.   dupixent 300 mg q 2 weeks will stop for now, but pt will hold on to it and see if he needs it for itch control.   Doxepin for breakthrough itch. 10 mg po qhs prn - was tolerated, but not as efficacious for itch.   Continue hi dose zyrtec 40 mg daily and pepcid 20 mg   Food label given and substitutes reviewed  [facility] labs egg, gluten, celiac, wheat were negative.   finished permethrin 5%   Lidocaine prn   Continue protopic     Body surface area >50%- arms, legs, abdomen anterior and posterior extremities with flanks, ankles, feet bilaterally.     Face to face > 50% of the visit > 35 " mins discussing care and management , exposure, and future steps.         Consuelo Herndon M.D.  Allergy/Immunology  [facility]   [phone] phone  [phone] fax

## 2019-11-16 ENCOUNTER — PATIENT MESSAGE (OUTPATIENT)
Dept: ALLERGY | Facility: CLINIC | Age: 49
End: 2019-11-16

## 2019-11-19 ENCOUNTER — PATIENT MESSAGE (OUTPATIENT)
Dept: ALLERGY | Facility: CLINIC | Age: 49
End: 2019-11-19

## 2019-11-20 ENCOUNTER — PATIENT MESSAGE (OUTPATIENT)
Dept: ALLERGY | Facility: CLINIC | Age: 49
End: 2019-11-20

## 2019-11-25 ENCOUNTER — PATIENT MESSAGE (OUTPATIENT)
Dept: ALLERGY | Facility: CLINIC | Age: 49
End: 2019-11-25

## 2019-12-17 ENCOUNTER — PATIENT MESSAGE (OUTPATIENT)
Dept: ALLERGY | Facility: CLINIC | Age: 49
End: 2019-12-17

## 2019-12-23 ENCOUNTER — PATIENT MESSAGE (OUTPATIENT)
Dept: ALLERGY | Facility: CLINIC | Age: 49
End: 2019-12-23

## 2019-12-27 ENCOUNTER — TELEPHONE (OUTPATIENT)
Dept: ALLERGY | Facility: CLINIC | Age: 49
End: 2019-12-27

## 2019-12-27 DIAGNOSIS — K90.49 FOOD INTOLERANCE: Primary | ICD-10-CM

## 2020-01-03 LAB — COW MILK IGE QN: <0.1 KU/L

## 2020-01-06 ENCOUNTER — PATIENT MESSAGE (OUTPATIENT)
Dept: ALLERGY | Facility: CLINIC | Age: 50
End: 2020-01-06

## 2020-02-25 ENCOUNTER — PATIENT MESSAGE (OUTPATIENT)
Dept: ALLERGY | Facility: CLINIC | Age: 50
End: 2020-02-25

## 2020-02-27 ENCOUNTER — PATIENT MESSAGE (OUTPATIENT)
Dept: ALLERGY | Facility: CLINIC | Age: 50
End: 2020-02-27

## 2020-03-07 ENCOUNTER — PATIENT MESSAGE (OUTPATIENT)
Dept: ALLERGY | Facility: CLINIC | Age: 50
End: 2020-03-07

## 2020-03-07 DIAGNOSIS — L20.84 INTRINSIC ATOPIC DERMATITIS: ICD-10-CM

## 2020-04-12 ENCOUNTER — PATIENT MESSAGE (OUTPATIENT)
Dept: ALLERGY | Facility: CLINIC | Age: 50
End: 2020-04-12

## 2020-04-12 DIAGNOSIS — L20.84 INTRINSIC ATOPIC DERMATITIS: ICD-10-CM

## 2020-04-15 ENCOUNTER — OFFICE VISIT (OUTPATIENT)
Dept: ALLERGY | Facility: CLINIC | Age: 50
End: 2020-04-15
Payer: COMMERCIAL

## 2020-04-15 DIAGNOSIS — L20.84 INTRINSIC ATOPIC DERMATITIS: Primary | ICD-10-CM

## 2020-04-15 DIAGNOSIS — R21 RASH AND NONSPECIFIC SKIN ERUPTION: ICD-10-CM

## 2020-04-15 PROCEDURE — 99213 OFFICE O/P EST LOW 20 MIN: CPT | Mod: 95,,, | Performed by: ALLERGY & IMMUNOLOGY

## 2020-04-15 PROCEDURE — 99213 PR OFFICE/OUTPT VISIT, EST, LEVL III, 20-29 MIN: ICD-10-PCS | Mod: 95,,, | Performed by: ALLERGY & IMMUNOLOGY

## 2020-04-15 RX ORDER — FAMOTIDINE 20 MG/1
20 TABLET, FILM COATED ORAL 2 TIMES DAILY
COMMUNITY

## 2020-04-15 RX ORDER — PREDNISONE 20 MG/1
20 TABLET ORAL
COMMUNITY

## 2020-04-15 RX ORDER — ALPRAZOLAM 0.5 MG/1
0.5 TABLET ORAL NIGHTLY PRN
COMMUNITY

## 2020-04-15 NOTE — PROGRESS NOTES
"Subjective:       Patient ID: Shar Hummel is a 49 y.o. male.    Chief Complaint: Rash      Pt presents today for rash.     Pt states the itch is improved with dupixent but not necessarily with the rash.   The rash character is the same location on the wrists and inner thighs  It "comes and goes"  Pt states he did eliminate eggs and "nothing changes"  Rash is flat on wrist, but raised on arms.   Pt taking cetirzine and pepcid in the mornings.   The last 2 weeks, his itching was exacerbated at night , and restarted BID dosing.   20 mg zyrtec in the am. 20 mg pepcid in the am.   10 mg cetrizine at night.   Not using doxepin and didn't find that this was helpful.   No side effects while taking dupixent. Eyes checked recently and doing well.   Suspects soy potentially.     Uses a hair product called suavacito. Uses essential oils but stopping these no different.   This is a palmade not a spray.   Stopped using cologne, only using essential oils.   Has had two biopsies with possible medication related.   No blistering.     Oral steroids has helped the most. The rash and itching resolve completely. Avoids for now due to COVID-19.   He did have 20 mg that he used about once per month until the quarantine.     After his visit, he required a medrol dose pack.   Using increased antihistamines did not help him with pruritus.   After his regimen, his rash started to return.   All food labs are negative serum IgE.   Foods tested: egg, gluten, wheat   Negative celiac  Onset: 1 year ago.   Location: legs upper lateral thighs. Can be "anywhere except groin and face."   Pt thinks that eating eggs is the trigger. However, avoiding egg and lab negative, still occurs.   Patch testing is negative via derm.   History of eating ray and rash recurring.   Rash is pruritic  Tx: betamethasone does help "some" doesn't last if used as a spray.   Tried clobetasol. Not helpful.   Tacrolimus- not helpful.   Possible wheat exposure on " his keto diet.   He can have associated abdominal pain as well, most temporally related to egg.   Egg can have immediate rash in 2 hours.  Abdominal pain possibly delayed.   Zyrtec 20 mg po bid has not been helpful with zantac 150 mg po bid.     Has seen three dermatologists, most recent one was Dr. Sanchez.     Pt reports yesterday tested for STI.     Atopic Hx    Rhinitis: endorses mild  Oral allergy: denies   Food allergy: see above   Asthma: denies   Latex tolerates  Nsaids: tolerates  Eczema denies   Urticaria denies     Infection History n/a     Pneumonia # in the past 12 months:  Sinus infection # in the past 12 months:  Otitis infection # in the past 12 months:           Component      Latest Ref Rng & Units 12/31/2019 10/5/2019   Latex (K82) IgE      Class 0 kU/L  <0.10   Milk IgE      Class 0 kU/L <0.10      Gliadin IgA Abs                 4                         0-19  units        Negative---    0 - 19      Weak Positive--   20 - 30      Moderate to Strong Positive   >30     Gliadin IgG Abs                 2                         0-19  units        Negative---    0 - 19      Weak Positive--   20 - 30      Moderate to Strong Positive   >30     Transglutaminase IgA AutoAbs <2  0-3 U/mL   Negative-   0 -  3      Weak Positive   4 - 10                          Positive-- >10                                            Tissue Transglutaminase (tTG) has been identified      as the endomysial antigen.  Studies have demonstr-      ated that endomysial IgA antibodies have over 99%      specificity for gluten sensitive enteropathy.     Transglutaminase IgG AutoAbs <2  0-5 U/mL   Negative-   0 - 5      Weak Positive   6 - 9                          Positive-- >9                                           Endomysial IGA Ab        Negative                     Negative              IgA, Serum                    309                 IgE Gluten                  <0.10                          IgE Egg White                <0.10                      Class 0  kU/L         Levels of Specific IgE-  Class  Description of Class      ---------------------------  -----  --------------------      < 0.10-    0-    Negative      0.10 -    0.31-    0/I-  Equivocal/Low      0.32 -    0.55-    I-    Low      0.56 -    1.40-    II-   Moderate      1.41 -    3.90-    III-  High      3.91 -   19.00-    IV-   Very High      19.01 -  100.00-    V-    Very High      >100.00-    VI-   Very High       General: neg unexpected weight changes, fevers, chills, night sweats, malaise  HEENT: see hpi, Neg eye pain, vision changes, ear drainage, nose bleeds, throat tightness, sores in the mouth  CV: Neg chest pain, palpitations, swelling  Resp: see hpi, neg shortness of breath, hemoptysis, cough  GI: see hpi, neg dysphagia, night abdominal pain, reflux, chronic diarrhea, chronic constipation  Derm: See Hpi, neg flushing  Mu/sk: Neg joint pain, joint swelling   Psych: Neg anxiety  neuro: neg chronic headaches, muscle weakness  Endo: neg heat/cold intolerance, chronic fatigue      Objective:     There were no vitals filed for this visit.     Physical Exam        General: no acute distress, well developed well nourished   Skin: dermatitis on bilateral thighs, left volar forearm surface. Right and left flank erythematous xerotic plaques that judy with palpation.       Assessment:       1. Intrinsic atopic dermatitis    2. Rash and nonspecific skin eruption        Plan:       Intrinsic atopic dermatitis    Rash and nonspecific skin eruption      Food avoidance eggs, delayed association but not consistent. Milk not consistent.   dupixent 300 mg q 2 weeks   Continue cetirizine and pepcid.   Patch test negative   bx not revealing   Stopped doxepin for breakthrough itch. 10 mg po qhs prn - was tolerated, but not as efficacious for itch.   Continue hi dose zyrtec 40 mg daily and pepcid 20 mg   Food label given and substitutes reviewed  [facility] labs egg, gluten, celiac, wheat  were negative.   finished permethrin 5%   Lidocaine prn   discontinued protopic not helpful     Body surface area >50%- arms, legs, abdomen anterior and posterior extremities with flanks, ankles, feet bilaterally.     The patient location is: home   The chief complaint leading to consultation is: Rash and atopic dermatitis   Visit type: audiovisual  Total time spent with patient: 20 mins   > 50 % spent in counseling and coordinating care.    Each patient to whom he or she provides medical services by telemedicine is:  (1) informed of the relationship between the physician and patient and the respective role of any other health care provider with respect to management of the patient; and (2) notified that he or she may decline to receive medical services by telemedicine and may withdraw from such care at any time.          Consuelo Herndon M.D.  Allergy/Immunology  [facility]   [phone] phone  [phone] fax

## 2020-04-18 ENCOUNTER — PATIENT MESSAGE (OUTPATIENT)
Dept: ALLERGY | Facility: CLINIC | Age: 50
End: 2020-04-18

## 2020-04-21 ENCOUNTER — PATIENT MESSAGE (OUTPATIENT)
Dept: ALLERGY | Facility: CLINIC | Age: 50
End: 2020-04-21

## 2020-05-07 DIAGNOSIS — L20.84 INTRINSIC ATOPIC DERMATITIS: ICD-10-CM

## 2020-05-07 RX ORDER — DUPILUMAB 300 MG/2ML
INJECTION, SOLUTION SUBCUTANEOUS
Qty: 4 ML | Refills: 12 | Status: SHIPPED | OUTPATIENT
Start: 2020-05-07 | End: 2020-05-11

## 2020-05-11 DIAGNOSIS — L20.84 INTRINSIC ATOPIC DERMATITIS: ICD-10-CM

## 2020-05-11 RX ORDER — DUPILUMAB 300 MG/2ML
INJECTION, SOLUTION SUBCUTANEOUS
Qty: 4 ML | Refills: 12 | Status: SHIPPED | OUTPATIENT
Start: 2020-05-11 | End: 2020-05-15

## 2020-05-15 DIAGNOSIS — L20.84 INTRINSIC ATOPIC DERMATITIS: ICD-10-CM

## 2020-05-15 RX ORDER — DUPILUMAB 300 MG/2ML
INJECTION, SOLUTION SUBCUTANEOUS
Qty: 4 ML | Refills: 12 | Status: SHIPPED | OUTPATIENT
Start: 2020-05-15

## 2020-05-19 DIAGNOSIS — L20.84 INTRINSIC ATOPIC DERMATITIS: ICD-10-CM

## 2020-05-19 RX ORDER — DUPILUMAB 300 MG/2ML
INJECTION, SOLUTION SUBCUTANEOUS
Qty: 4 ML | Refills: 12 | OUTPATIENT
Start: 2020-05-19

## 2020-05-21 ENCOUNTER — PATIENT MESSAGE (OUTPATIENT)
Dept: ALLERGY | Facility: CLINIC | Age: 50
End: 2020-05-21

## 2020-06-25 ENCOUNTER — LAB VISIT (OUTPATIENT)
Dept: PRIMARY CARE CLINIC | Facility: OTHER | Age: 50
End: 2020-06-25
Payer: COMMERCIAL

## 2020-06-25 DIAGNOSIS — Z03.818 ENCOUNTER FOR OBSERVATION FOR SUSPECTED EXPOSURE TO OTHER BIOLOGICAL AGENTS RULED OUT: Primary | ICD-10-CM

## 2020-06-25 PROCEDURE — U0003 INFECTIOUS AGENT DETECTION BY NUCLEIC ACID (DNA OR RNA); SEVERE ACUTE RESPIRATORY SYNDROME CORONAVIRUS 2 (SARS-COV-2) (CORONAVIRUS DISEASE [COVID-19]), AMPLIFIED PROBE TECHNIQUE, MAKING USE OF HIGH THROUGHPUT TECHNOLOGIES AS DESCRIBED BY CMS-2020-01-R: HCPCS

## 2020-06-28 LAB — SARS-COV-2 RNA RESP QL NAA+PROBE: NOT DETECTED

## 2021-01-20 DIAGNOSIS — R10.13 EPIGASTRIC PAIN: Primary | ICD-10-CM

## 2021-01-26 ENCOUNTER — HOSPITAL ENCOUNTER (OUTPATIENT)
Dept: RADIOLOGY | Facility: HOSPITAL | Age: 51
Discharge: HOME | End: 2021-01-26
Attending: INTERNAL MEDICINE
Payer: COMMERCIAL

## 2021-01-26 DIAGNOSIS — R10.13 EPIGASTRIC PAIN: ICD-10-CM

## 2021-01-26 PROCEDURE — 76705 ECHO EXAM OF ABDOMEN: CPT | Mod: TC,PO

## 2021-05-15 NOTE — PROGRESS NOTES
SUBJECTIVE:    Patient ID: Shar Hummel is a 48 y.o. male.    Chief Complaint: Annual Exam    48-year-old male new to this provider no past medical records to review pulses to clinic today for an annual exam but has eased refill for hypertension, neck pain and restless leg syndrome.  Patient had hypertension for multiple years currently on ARB blood pressures well controlled will continue on his current medication.  Patient has degenerative disc disease of the cervical spine causing muscle spasms patient on Zanaflex that he uses periodically when he is having muscle spasms.  Been diagnosis with leg syndrome currently on Requip, patient states she does not take this nightly when he is having episodes of restless leg he'll begin taking it daily for about 3 weeks and he'll stop.    Hypertension   This is a chronic problem. The current episode started more than 1 year ago. The problem is controlled. Associated symptoms include neck pain. Pertinent negatives include no anxiety, blurred vision, chest pain, headaches, malaise/fatigue, orthopnea, palpitations, peripheral edema, PND, shortness of breath or sweats. There are no associated agents to hypertension. Risk factors for coronary artery disease include male gender and obesity. Past treatments include angiotensin blockers. The current treatment provides significant improvement. There are no compliance problems.  There is no history of angina, kidney disease, CAD/MI, CVA, heart failure, left ventricular hypertrophy, PVD or retinopathy. There is no history of chronic renal disease, a hypertension causing med or a thyroid problem.   Neck Pain    This is a chronic problem. The current episode started more than 1 year ago. The problem occurs intermittently. The problem has been waxing and waning. The pain is present in the left side and right side. The quality of the pain is described as cramping. The pain is at a severity of 4/10. The pain is mild. The symptoms  are aggravated by twisting. The pain is worse during the day. Pertinent negatives include no chest pain, fever, headaches, leg pain, numbness, pain with swallowing, paresis, photophobia, syncope, tingling, trouble swallowing, visual change, weakness or weight loss. He has tried muscle relaxants and NSAIDs for the symptoms. The treatment provided moderate relief.         Past Medical History:   Diagnosis Date    Cervical disc disorder 2/11/2016    Hypertension     Kidney stone     x 1 (passed) analyzed: calcium oxalate     Social History     Socioeconomic History    Marital status:      Spouse name: Not on file    Number of children: Not on file    Years of education: Not on file    Highest education level: Not on file   Social Needs    Financial resource strain: Not on file    Food insecurity - worry: Not on file    Food insecurity - inability: Not on file    Transportation needs - medical: Not on file    Transportation needs - non-medical: Not on file   Occupational History    Occupation:      Employer: [employer]   Tobacco Use    Smoking status: Never Smoker    Smokeless tobacco: Never Used   Substance and Sexual Activity    Alcohol use: Yes     Comment: seldom    Drug use: No    Sexual activity: Yes     Partners: Female   Other Topics Concern    Not on file   Social History Narrative    Not on file     Past Surgical History:   Procedure Laterality Date    acoustic neuroma surgery      BRAIN SURGERY      CIRCUMCISION, PRIMARY       Family History   Problem Relation Age of Onset    Diabetes Father     Heart disease Father     Hypertension Father      Current Outpatient Medications   Medication Sig Dispense Refill    losartan (COZAAR) 50 MG tablet Take 1 tablet (50 mg total) by mouth once daily. 90 tablet 0    rOPINIRole (REQUIP) 0.25 MG tablet Take 1 tablet (0.25 mg total) by mouth every evening. 30 tablet 3    tiZANidine (ZANAFLEX) 4 MG tablet Take  "1 tablet (4 mg total) by mouth every 6 (six) hours as needed. 90 tablet 0     No current facility-administered medications for this visit.      Review of patient's allergies indicates:   Allergen Reactions    Betamethasone Other (See Comments)     Couldn't stand after getting injection    Bupropion hcl      Other reaction(s): Hives    Neuromuscular blockers, steroidal      Other reaction(s): Muscle pain    Sulfa (sulfonamide antibiotics) Other (See Comments)     headaches    Sulfamethoxazole-trimethoprim Other (See Comments)    Gabapentin Rash       Review of Systems   Constitutional: Negative for activity change, appetite change, chills, diaphoresis, fatigue, fever, malaise/fatigue and weight loss.   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 Negative for rhinorrhea, sinus pressure, sinus pain and trouble swallowing.    Eyes: Negative for blurred vision, photophobia and discharge.   Respiratory: Negative for cough, chest tightness, shortness of breath and wheezing.    Cardiovascular: Negative for chest pain, palpitations, orthopnea, leg swelling, syncope and PND.   Gastrointestinal: Negative for blood in stool, constipation, diarrhea, nausea and vomiting.   Endocrine: Negative for polydipsia and polyuria.   Genitourinary: Negative for difficulty urinating, dysuria, frequency, hematuria and urgency.   Musculoskeletal: Positive for neck pain. Negative for arthralgias and joint swelling.   Neurological: Negative for tingling, weakness, numbness and headaches.   Psychiatric/Behavioral: Negative for confusion and dysphoric mood.          Blood pressure 132/80, pulse 79, temperature 98.2 °F (36.8 °C), height 5' 6" (1.676 m), weight 89.4 kg (197 lb), SpO2 98 %. Body mass index is 31.8 kg/m².   Objective:      Physical Exam   Constitutional: He is oriented to person, place, and time. He appears well-developed and well-nourished. " No distress.   HENT:   Head: Normocephalic and atraumatic.   Right Ear: External ear normal.   Left Ear: External ear normal.   Nose: Nose normal.   Mouth/Throat: Oropharynx is clear and moist. No oropharyngeal exudate.   Eyes: Conjunctivae and EOM are normal. Pupils are equal, round, and reactive to light. Right eye exhibits no discharge. Left eye exhibits no discharge. No scleral icterus.   Neck: Normal range of motion. Neck supple. No thyromegaly present.   Cardiovascular: Normal rate, regular rhythm and normal heart sounds.   No murmur heard.  Pulmonary/Chest: Effort normal and breath sounds normal. No respiratory distress. He has no wheezes.   Lymphadenopathy:     He has no cervical adenopathy.   Neurological: He is alert and oriented to person, place, and time.   Skin: Skin is warm and dry. Capillary refill takes less than 2 seconds. No rash noted. He is not diaphoretic.           Assessment:       1. Hypertension, unspecified type    2. Restless leg syndrome, controlled    3. Cervical disc disorder    4. Lipid screening         Plan:           Hypertension, unspecified type  -     Basic metabolic panel; Future; Expected date: 10/17/2018  Blood pressure well controlled on losartan 50 mg daily continue on his current medication, patient has lost a significant amount of weight since last visit through diet and exercise, but his blood pressure is not low enough to consider taking off his medications at this time.    Restless leg syndrome, controlled  -     rOPINIRole (REQUIP) 0.25 MG tablet; Take 1 tablet (0.25 mg total) by mouth every evening.  Dispense: 30 tablet; Refill: 3  Patient states his restless leg syndrome is controlled with the Requip he'll take it for a couple weeks time when he starts to develop symptoms, he is not symptomatic every night so does not use medications the entire month.    Cervical disc disorder  -     tiZANidine (ZANAFLEX) 4 MG tablet; Take 1 tablet (4 mg total) by mouth every 6 (six)  hours as needed.  Dispense: 90 tablet; Refill: 0  Periodic muscle spasm secondary to cervical disc disorder we'll refill his Zanaflex    Lipid screening  -     Lipid panel; Future; Expected date: 10/17/2018                         normal...